# Patient Record
(demographics unavailable — no encounter records)

---

## 2024-10-29 NOTE — END OF VISIT
[] : Fellow [FreeTextEntry3] : 57 yo F with stage IIIB NSCLC, EGFR L858R, s/p chemoRT completed 7/16, followed by adjuvant Tagrisso 80 mg QD per LEE trial here for follow up post hospitalization. --diagnosed with pneumonitis - either radiation induced vs Tagrisso --on prednisone taper per  --on PCP ppx and PPI -- She is symptomatically significantly improved, finally off oxygen able to walk at least 30 minutes without shortness of breath --She is scheduled for repeat CT chest at the end of this week, we discussed depending on CT chest result we could discuss rechallenge with Tagrisso --If CT chest looks significantly improved we could rechallenge with Tagrisso 40 mg daily while she still continues on steroids -- Ordered CBC, CMP today -- Follow-up in this office in 3 weeks, will call her after I discussed her case with Dr. Nieto after repeat imaging [Time Spent: ___ minutes] : I have spent [unfilled] minutes of time on the encounter which excludes teaching and separately reported services.

## 2024-10-29 NOTE — HISTORY OF PRESENT ILLNESS
[Disease: _____________________] : Disease: [unfilled] [T: ___] : T[unfilled] [N: ___] : N[unfilled] [M: ___] : M[unfilled] [AJCC Stage: ____] : AJCC Stage: [unfilled] [90: Able to carry normal activity; minor signs or symptoms of disease.] : 90: Able to carry normal activity; minor signs or symptoms of disease.  [ECOG Performance Status: 1 - Restricted in physically strenuous activity but ambulatory and able to carry out work of a light or sedentary nature] : Performance Status: 1 - Restricted in physically strenuous activity but ambulatory and able to carry out work of a light or sedentary nature, e.g., light house work, office work [de-identified] : America Darby is a 58-year-old female who presents to the clinic for follow up of stage IIIB NSCLC - adenocarcinoma histology (EGFR L858R).  Onx Hx: History obtained via translation from daughters Patient noted to have a cough in September 2023, initially thought to be URI, nonresponsive to steroids, zeepack/antibiotics. Further workup (see below) showed RUL nodule with mediastinal and right hilar lymphadenopathy, and bronchoscopy performed in April confirmed diagnosis of lung adenocarcinoma.  FH Noncontributory  SH nonsmoker, second hand smoking from . No alcohol, other drugs, or other supplements [facility] [employer] - stopped working since daughters prefer her not to travel Lives in [place] Fully functional at home  2/29/24: CTX: Opacity in the medial right upper lobe, right paratracheal region.  3/22/24: CT: Medial subpleural right upper lobe mildy spiculated nodule. Mediastinal and right hilar lymphadenopathy as described, most notably a necrotic right paratracheal jean pierre conglomerate. These findings are suspicious for primary lung malignancy. Question of direct intravascular invasion involving the superior vena cava and left branchiocephalic vein.  4/6/24: PET: Right upper lobe lung mass shows significant metabolic activity and likely malignant. Bilateral supraclavicular, mediastinal and bilateral hilar adenopathy showing moderate-significant activity. Nodularity and thickening along the right major fissure in the upper lobe but no metabolic activity. Left adnexal cyst. In a postmenopausal patient, this can be followed by transvaginal ultrasound urgent basis. Bilateral cystic structures following there is sacral foramen which are probably perineural cysts amd can be further evaluated with MRI of the sacrum.  4/12/24 BAL RUL - non-small cell carcinoma, adenocarcinoma of lung origin  Results of FB EBUS biopsy performed on 04/12/2024 of the RUL bronchoalevolar lavage was positive for malignant cells and was a non-small cell carcinoma. The tracheal biopsy was an adenocarcinoma of lung origin. Lymph node 4R was positive for malignant cells and is a metastatic non-small cell carcinoma. Note: Immunostains performed shows that the tumor cells are positive Tier I: Variants of Strong Clinical Significance  EGFR p.([id number]) Tier II: Variants of Potential Clinical Significance  TP53 p.([id number]) Tier III: Variants of Unknown Clinical Significance CDKN2A p.([id number]) PD-L1 1-2% 4/29/24 MRIB - negative for metastatic disease  6/26/24: CT Chest: 1. Nonocclusive segmental branch pulmonary artery embolism involving the lateral basal segment to the left lower lobe. No evidence of pulmonary hemorrhage or infarction. No pulmonary artery dilatation or right ventricular strain. 2. Loculated right upper lobe neoplasm which may be minimally smaller in size as described above. 3. Stable confluent mediastinal lymphadenopathy with near complete occlusion of the brachiocephalic confluence and proximal IVC. The results of this examination were verbally communicated with read back to the physician, LILLIAN GREEN, on on 6/26/2024 at 12:08 PM.   8/20/24: Ct Chest:  1. Slight interval decrease in the size of a spiculated nodular neoplasm within the apical segment of the right upper lobe. 2. 2 new linear opacities within the apical segment of the right upper lobe as above. Short interval surveillance is suggested. 3. The remainder the study is unchanged with bulky mediastinal and perihilar lymphadenopathy with stable near complete slitlike occlusion of the superior vena cava and mild progressive narrowing of the right main and right upper lobe bronchus.   Ms. Darby recently presented to the Emergency Department (ED) with a 3-4 day history of dyspnea and dry cough. She was found to be hypoxic in the ED, oxygen sat of 90% on room air. CT scan revealed new bilateral symmetric consolidative and ground-glass airspace disease, predominantly in the mid and upper lung fields, raising concerns for radiation pneumonitis. She was started on high dose steroids and HFNC. A transbronchial biopsy was performed, which indicated organizing pneumonia.   She was gradually weaned down to room air, requiring 2 liters of oxygen on exertion. She was discharged on a tapering dose of steroids with Pneumocystis jirovecii pneumonia (PJP) prophylaxis.   [de-identified] : Adenocarcinoma - PD-L1 1-2%, EGFR L858R [de-identified] : CTSx: Dr.Lee Yorkc: Dr.Wernicke [FreeTextEntry1] : 5/23/2024: C1 cisplatin/pemetrexed RT start 6/3/2024 6/13/24   C2 cisplatin/pemetrexed  7/10/2024: C3 cisplatin/pemetrexed completed  7/18/2024: Last day of RT 8/24/2024: Tagriso 80 mg Qd- [de-identified] : The patient was seen and evaluated in the office. SOB has improved and she is not requiring supplemental oxygen. She can walk for up to 20 minutes before feeling short of breath.  She is on a prednisone taper - currently 25mg, 20mg starting Thursday. She has a repeat CT due 11/1/24. Reports fingertip numbness and tingling x 2.5 weeks, which does not interfere with her ADLs. Has an ophthalmology appointment 11/12/24 to evaluate her cataracts.

## 2024-10-29 NOTE — ASSESSMENT
[FreeTextEntry1] : America Darby is a 58-year-old female with no significant prior PMH who presents for follow up of for lung adenocarcinoma, [id number] - Stage IIIB, EGFR L858R.   Stage IIIB Lung Adenocarcinoma with supraclavicular, mediastinal and bilateral hilar adenopathy - EGFR exon 21 L858R mutation positive on NGS - PET/CT on 04/06/2024 - RUL lung mass shows significant metabolic activity, B/L supraclavicular, mediastinal and b/l hilar adenopathy showing moderate-significant metabolic activity; Nodularity and thickening along the right major fissure in the upper lobe but no metabolic activity; Left adnexal cyst.;B/L cystic structures following there is sacral foramen which are probably perineural cysts and can be further evaluated with MRI of the sacrum. - 4/12/2024 - FB EBUS biopsy on 04/12/2024 - tandard of care treatment for stage IIIB lung cancer is concurrent chemoradiation (prefer cisplatin/pemetrexed for adenocarcinoma histology), followed by immunotherapy, though given EGFR mutation, we will substitute tagrisso for maintenance therapy after concurrent chemoradaition despite PACIFIC trial results.  Recent approval based on LEE trial - Tagrisso post CCRT with significant prolongation of PFS.  Completed cisplatin/pemetrexed every 3 weeks x  3 cycles with concurrent RT, was on osimertinib maintenance therapy with plan to continue indefinitely per LEE trail results (June 2024) however, the patient developed pneumonitis and it's been on hold since 9/14  -- We will continue to withhold Tagrisso. We will re-evaluate in four weeks and plan to rechallenge with Tagrisso if she continues to improve in respiratory function.  -- C/w steroid taper and PCP prophylaxis for pneumonitis. She is currently on 25mg prednisone, tapered by 5mg each week. -- Repeat CT scheduled for 11/1/24 -- depending on CT results, plan to rechallenge with 40mg Tagrisso with follow up in 3 weeks. -- ordered labs CBC, CMP -- monitor EKG Q 3-6 months once back on Tagrisso -- labs next visit: CBC, CMP  Pulmonary embolus likely hypercoagulability in setting of malignancy -- Continue with Eliquis 5 mg twice daily  Anemia - (improved, Hb 11.2 today) --monitor

## 2024-10-29 NOTE — HISTORY OF PRESENT ILLNESS
[Disease: _____________________] : Disease: [unfilled] [T: ___] : T[unfilled] [N: ___] : N[unfilled] [M: ___] : M[unfilled] [AJCC Stage: ____] : AJCC Stage: [unfilled] [90: Able to carry normal activity; minor signs or symptoms of disease.] : 90: Able to carry normal activity; minor signs or symptoms of disease.  [ECOG Performance Status: 1 - Restricted in physically strenuous activity but ambulatory and able to carry out work of a light or sedentary nature] : Performance Status: 1 - Restricted in physically strenuous activity but ambulatory and able to carry out work of a light or sedentary nature, e.g., light house work, office work [de-identified] : America Darby is a 58-year-old female who presents to the clinic for follow up of stage IIIB NSCLC - adenocarcinoma histology (EGFR L858R).  Onx Hx: History obtained via translation from daughters Patient noted to have a cough in September 2023, initially thought to be URI, nonresponsive to steroids, zeepack/antibiotics. Further workup (see below) showed RUL nodule with mediastinal and right hilar lymphadenopathy, and bronchoscopy performed in April confirmed diagnosis of lung adenocarcinoma.  FH Noncontributory  SH nonsmoker, second hand smoking from . No alcohol, other drugs, or other supplements [employer] - stopped working since daughters prefer her not to travel Lives in [place] Fully functional at home  2/29/24: CTX: Opacity in the medial right upper lobe, right paratracheal region.  3/22/24: CT: Medial subpleural right upper lobe mildy spiculated nodule. Mediastinal and right hilar lymphadenopathy as described, most notably a necrotic right paratracheal jean pierre conglomerate. These findings are suspicious for primary lung malignancy. Question of direct intravascular invasion involving the superior vena cava and left branchiocephalic vein.  4/6/24: PET: Right upper lobe lung mass shows significant metabolic activity and likely malignant. Bilateral supraclavicular, mediastinal and bilateral hilar adenopathy showing moderate-significant activity. Nodularity and thickening along the right major fissure in the upper lobe but no metabolic activity. Left adnexal cyst. In a postmenopausal patient, this can be followed by transvaginal ultrasound urgent basis. Bilateral cystic structures following there is sacral foramen which are probably perineural cysts amd can be further evaluated with MRI of the sacrum.  4/12/24 BAL RUL - non-small cell carcinoma, adenocarcinoma of lung origin  Results of FB EBUS biopsy performed on 04/12/2024 of the RUL bronchoalevolar lavage was positive for malignant cells and was a non-small cell carcinoma. The tracheal biopsy was an adenocarcinoma of lung origin. Lymph node 4R was positive for malignant cells and is a metastatic non-small cell carcinoma. Note: Immunostains performed shows that the tumor cells are positive Tier I: Variants of Strong Clinical Significance  EGFR p.([id number]) Tier II: Variants of Potential Clinical Significance  TP53 p.([id number]) Tier III: Variants of Unknown Clinical Significance CDKN2A p.([id number]) PD-L1 1-2% 4/29/24 MRIB - negative for metastatic disease  6/26/24: CT Chest: 1. Nonocclusive segmental branch pulmonary artery embolism involving the lateral basal segment to the left lower lobe. No evidence of pulmonary hemorrhage or infarction. No pulmonary artery dilatation or right ventricular strain. 2. Loculated right upper lobe neoplasm which may be minimally smaller in size as described above. 3. Stable confluent mediastinal lymphadenopathy with near complete occlusion of the brachiocephalic confluence and proximal IVC. The results of this examination were verbally communicated with read back to the physician, LILLIAN GREEN, on on 6/26/2024 at 12:08 PM.   8/20/24: Ct Chest:  1. Slight interval decrease in the size of a spiculated nodular neoplasm within the apical segment of the right upper lobe. 2. 2 new linear opacities within the apical segment of the right upper lobe as above. Short interval surveillance is suggested. 3. The remainder the study is unchanged with bulky mediastinal and perihilar lymphadenopathy with stable near complete slitlike occlusion of the superior vena cava and mild progressive narrowing of the right main and right upper lobe bronchus.   Ms. Darby recently presented to the Emergency Department (ED) with a 3-4 day history of dyspnea and dry cough. She was found to be hypoxic in the ED, oxygen sat of 90% on room air. CT scan revealed new bilateral symmetric consolidative and ground-glass airspace disease, predominantly in the mid and upper lung fields, raising concerns for radiation pneumonitis. She was started on high dose steroids and HFNC. A transbronchial biopsy was performed, which indicated organizing pneumonia.   She was gradually weaned down to room air, requiring 2 liters of oxygen on exertion. She was discharged on a tapering dose of steroids with Pneumocystis jirovecii pneumonia (PJP) prophylaxis.   [de-identified] : Adenocarcinoma - PD-L1 1-2%, EGFR L858R [de-identified] : CTSx: Dr.Lee Yorkc: Dr.Wernicke [FreeTextEntry1] : 5/23/2024: C1 cisplatin/pemetrexed RT start 6/3/2024 6/13/24   C2 cisplatin/pemetrexed  7/10/2024: C3 cisplatin/pemetrexed completed  7/18/2024: Last day of RT 8/24/2024: Tagriso 80 mg Qd- [de-identified] : The patient was seen and evaluated in the office. SOB has improved and she is not requiring supplemental oxygen. She can walk for up to 20 minutes before feeling short of breath.  She is on a prednisone taper - currently 25mg, 20mg starting Thursday. She has a repeat CT due 11/1/24. Reports fingertip numbness and tingling x 2.5 weeks, which does not interfere with her ADLs. Has an ophthalmology appointment 11/12/24 to evaluate her cataracts.

## 2024-10-29 NOTE — ASSESSMENT
[FreeTextEntry1] : America Darby is a 58-year-old female with no significant prior PMH who presents for follow up of for lung adenocarcinoma, [id number] - Stage IIIB, EGFR L858R.   Stage IIIB Lung Adenocarcinoma with supraclavicular, mediastinal and bilateral hilar adenopathy - EGFR exon 21 L858R mutation positive on NGS - PET/CT on 04/06/2024 - RUL lung mass shows significant metabolic activity, B/L supraclavicular, mediastinal and b/l hilar adenopathy showing moderate-significant metabolic activity; Nodularity and thickening along the right major fissure in the upper lobe but no metabolic activity; Left adnexal cyst.;B/L cystic structures following there is sacral foramen which are probably perineural cysts and can be further evaluated with MRI of the sacrum. - 4/12/2024 - FB EBUS biopsy on 04/12/2024 - tandard of care treatment for stage IIIB lung cancer is concurrent chemoradiation (prefer cisplatin/pemetrexed for adenocarcinoma histology), followed by immunotherapy, though given EGFR mutation, we will substitute tagrisso for maintenance therapy after concurrent chemoradaition despite PACIFIC trial results.  Recent approval based on LEE trial - Tagrisso post CCRT with significant prolongation of PFS.  Completed cisplatin/pemetrexed every 3 weeks x  3 cycles with concurrent RT, was on osimertinib maintenance therapy with plan to continue indefinitely per LEE trail results (June 2024) however, the patient developed pneumonitis and it's been on hold since 9/14  -- We will continue to withhold Tagrisso. We will re-evaluate in four weeks and plan to rechallenge with Tagrisso if she continues to improve in respiratory function.  -- C/w steroid taper and PCP prophylaxis for pneumonitis. She is currently on 25mg prednisone, tapered by 5mg each week. -- Repeat CT scheduled for 11/1/24 -- depending on CT results, plan to rechallenge with 40mg Tagrisso with follow up in 3 weeks. -- ordered labs CBC, CMP -- monitor EKG Q 3-6 months once back on Tagrisso -- labs next visit: CBC, CMP  Pulmonary embolus likely hypercoagulability in setting of malignancy -- Continue with Eliquis 5 mg twice daily  Anemia - (improved, Hb 11.2 today) --monitor

## 2024-10-29 NOTE — ASSESSMENT
[FreeTextEntry1] : America Darby is a 58-year-old female with no significant prior PMH who presents for follow up of for lung adenocarcinoma, [id number] - Stage IIIB, EGFR L858R.   Stage IIIB Lung Adenocarcinoma with supraclavicular, mediastinal and bilateral hilar adenopathy - EGFR exon 21 L858R mutation positive on NGS - PET/CT on 04/06/2024 - RUL lung mass shows significant metabolic activity, B/L supraclavicular, mediastinal and b/l hilar adenopathy showing moderate-significant metabolic activity; Nodularity and thickening along the right major fissure in the upper lobe but no metabolic activity; Left adnexal cyst.;B/L cystic structures following there is sacral foramen which are probably perineural cysts and can be further evaluated with MRI of the sacrum. - 4/12/2024 - FB EBUS biopsy on 04/12/2024 - tandard of care treatment for stage IIIB lung cancer is concurrent chemoradiation (prefer cisplatin/pemetrexed for adenocarcinoma histology), followed by immunotherapy, though given EGFR mutation, we will substitute tagrisso for maintenance therapy after concurrent chemoradaition despite PACIFIC trial results.  Recent approval based on LEE trial - Tagrisso post CCRT with significant prolongation of PFS.  Completed cisplatin/pemetrexed every 3 weeks x  3 cycles with concurrent RT, was on osimertinib maintenance therapy with plan to continue indefinitely per LEE trail results (June 2024) however, the patient developed pneumonitis and it's been on hold since 9/14  -- We will continue to withhold Tagrisso. We will re-evaluate in four weeks and plan to rechallenge with Tagrisso if she continues to improve in respiratory function.  -- C/w steroid taper and PCP prophylaxis for pneumonitis. She is currently on 25mg prednisone, tapered by 5mg each week. -- Repeat CT scheduled for 11/1/24 -- depending on CT results, plan to rechallenge with 40mg Tagrisso with follow up in 3 weeks. -- ordered labs CBC, CMP -- monitor EKG Q 3-6 months once back on Tagrisso -- labs next visit: CBC, CMP  Pulmonary embolus likely hypercoagulability in setting of malignancy -- Continue with Eliquis 5 mg twice daily  Anemia - (improved, Hb 11.2 today) --monitor

## 2024-10-29 NOTE — HISTORY OF PRESENT ILLNESS
[Disease: _____________________] : Disease: [unfilled] [T: ___] : T[unfilled] [N: ___] : N[unfilled] [M: ___] : M[unfilled] [AJCC Stage: ____] : AJCC Stage: [unfilled] [90: Able to carry normal activity; minor signs or symptoms of disease.] : 90: Able to carry normal activity; minor signs or symptoms of disease.  [ECOG Performance Status: 1 - Restricted in physically strenuous activity but ambulatory and able to carry out work of a light or sedentary nature] : Performance Status: 1 - Restricted in physically strenuous activity but ambulatory and able to carry out work of a light or sedentary nature, e.g., light house work, office work [de-identified] : America Darby is a 58-year-old female who presents to the clinic for follow up of stage IIIB NSCLC - adenocarcinoma histology (EGFR L858R).  Onx Hx: History obtained via translation from daughters Patient noted to have a cough in September 2023, initially thought to be URI, nonresponsive to steroids, zeepack/antibiotics. Further workup (see below) showed RUL nodule with mediastinal and right hilar lymphadenopathy, and bronchoscopy performed in April confirmed diagnosis of lung adenocarcinoma.  FH Noncontributory  SH nonsmoker, second hand smoking from . No alcohol, other drugs, or other supplements [facility] - stopped working since daughters prefer her not to travel Lives in [place] Fully functional at home  2/29/24: CTX: Opacity in the medial right upper lobe, right paratracheal region.  3/22/24: CT: Medial subpleural right upper lobe mildy spiculated nodule. Mediastinal and right hilar lymphadenopathy as described, most notably a necrotic right paratracheal jean pierre conglomerate. These findings are suspicious for primary lung malignancy. Question of direct intravascular invasion involving the superior vena cava and left branchiocephalic vein.  4/6/24: PET: Right upper lobe lung mass shows significant metabolic activity and likely malignant. Bilateral supraclavicular, mediastinal and bilateral hilar adenopathy showing moderate-significant activity. Nodularity and thickening along the right major fissure in the upper lobe but no metabolic activity. Left adnexal cyst. In a postmenopausal patient, this can be followed by transvaginal ultrasound urgent basis. Bilateral cystic structures following there is sacral foramen which are probably perineural cysts amd can be further evaluated with MRI of the sacrum.  4/12/24 BAL RUL - non-small cell carcinoma, adenocarcinoma of lung origin  Results of FB EBUS biopsy performed on 04/12/2024 of the RUL bronchoalevolar lavage was positive for malignant cells and was a non-small cell carcinoma. The tracheal biopsy was an adenocarcinoma of lung origin. Lymph node 4R was positive for malignant cells and is a metastatic non-small cell carcinoma. Note: Immunostains performed shows that the tumor cells are positive Tier I: Variants of Strong Clinical Significance  EGFR p.([id number]) Tier II: Variants of Potential Clinical Significance  TP53 p.([id number]) Tier III: Variants of Unknown Clinical Significance CDKN2A p.([id number]) PD-L1 1-2% 4/29/24 MRIB - negative for metastatic disease  6/26/24: CT Chest: 1. Nonocclusive segmental branch pulmonary artery embolism involving the lateral basal segment to the left lower lobe. No evidence of pulmonary hemorrhage or infarction. No pulmonary artery dilatation or right ventricular strain. 2. Loculated right upper lobe neoplasm which may be minimally smaller in size as described above. 3. Stable confluent mediastinal lymphadenopathy with near complete occlusion of the brachiocephalic confluence and proximal IVC. The results of this examination were verbally communicated with read back to the physician, LILLIAN GREEN, on on 6/26/2024 at 12:08 PM.   8/20/24: Ct Chest:  1. Slight interval decrease in the size of a spiculated nodular neoplasm within the apical segment of the right upper lobe. 2. 2 new linear opacities within the apical segment of the right upper lobe as above. Short interval surveillance is suggested. 3. The remainder the study is unchanged with bulky mediastinal and perihilar lymphadenopathy with stable near complete slitlike occlusion of the superior vena cava and mild progressive narrowing of the right main and right upper lobe bronchus.   Ms. Darby recently presented to the Emergency Department (ED) with a 3-4 day history of dyspnea and dry cough. She was found to be hypoxic in the ED, oxygen sat of 90% on room air. CT scan revealed new bilateral symmetric consolidative and ground-glass airspace disease, predominantly in the mid and upper lung fields, raising concerns for radiation pneumonitis. She was started on high dose steroids and HFNC. A transbronchial biopsy was performed, which indicated organizing pneumonia.   She was gradually weaned down to room air, requiring 2 liters of oxygen on exertion. She was discharged on a tapering dose of steroids with Pneumocystis jirovecii pneumonia (PJP) prophylaxis.   [de-identified] : Adenocarcinoma - PD-L1 1-2%, EGFR L858R [de-identified] : CTSx: Dr.Lee Yorkc: Dr.Wernicke [FreeTextEntry1] : 5/23/2024: C1 cisplatin/pemetrexed RT start 6/3/2024 6/13/24   C2 cisplatin/pemetrexed  7/10/2024: C3 cisplatin/pemetrexed completed  7/18/2024: Last day of RT 8/24/2024: Tagriso 80 mg Qd- [de-identified] : The patient was seen and evaluated in the office. SOB has improved and she is not requiring supplemental oxygen. She can walk for up to 20 minutes before feeling short of breath.  She is on a prednisone taper - currently 25mg, 20mg starting Thursday. She has a repeat CT due 11/1/24. Reports fingertip numbness and tingling x 2.5 weeks, which does not interfere with her ADLs. Has an ophthalmology appointment 11/12/24 to evaluate her cataracts.

## 2024-11-06 NOTE — ASSESSMENT
[FreeTextEntry1] : Labs: 10/2024: WBC 12.6, Hgb 9.9, Plts 273 Na 133 K 4.1, Cl 102, HCO3 28, BUN/creat 18/1.00, glucose 167, Ca 9.1 Tbili 0.6, AST/ALT 63/59, Alk phos 66, TP/Albumin 6.4/2.8  Bronchoscopy (4/2024): BAL: POSITIVE FOR MALIGNANT CELLS. Non- small cell carcinoma.  Final Diagnosis Trachea, biopsy and touch preparation: -Adenocarcinoma of lung origin. Note: Immunostains performed shows that the tumor cells are positive for TTF-1 and negative for p40.  This  staining profile supports the diagnosis.  Final Diagnosis LYMPH NODE, 4R, EBUS-GUIDED FNA POSITIVE FOR MALIGNANT CELLS. Metastatic non-small cell carcinoma. Cell block shows similar findings. Positive EGFR mutation  Bronchoscopy (9/2024): WBC 38 (L15, P12, M8, Eos 3)  BRONCHOALVEOLAR LAVAGE, RIGHT, LOWER LOBE Final Diagnosis LUNG, RIGHT LOWER LOBE, BAL: NEGATIVE FOR MALIGNANT CELLS Benign bronchial cells, alveolar macrophages, inflammatory cells. The cellblock shows similar findings.  Transbronchial biopsy: Final Diagnosis 1. Lung , right lower lobe, biopsy: - A minute fragment of lung tissue with reactive changes and focal areas suggestive of an organizing pneumonia.  Bacterial culture: negative AFB culture: NGTD Fungus culture: NGTD  BAL PJP PCR negative.  PET/CT (4/2024): Right upper lobe lung mass shows significant metabolic activity and likely malignant. Bilateral supraclavicular, mediastinal and bilateral hilar adenopathy showing moderate-significant activity. Nodularity and thickening along the right major fissure in the upper lobe but no metabolic activity. Left adnexal cyst. In a postmenopausal patient, this can be followed by transvaginal ultrasound urgent basis. Bilateral cystic structures following there is sacral foramen which are probably perineural cysts and can be further evaluated with MRI of the sacrum.  Chest CT (6/26/24) 1. Nonocclusive segmental branch pulmonary artery embolism involving the lateral basal segment to the left lower lobe. No evidence of pulmonary hemorrhage or infarction. No pulmonary artery dilatation or right ventricular strain. Loculated right upper lobe neoplasm which may be minimally smaller in size. Stable confluent mediastinal lymphadenopathy with near complete occlusion of the brachiocephalic confluence and proximal IVC  Ct Chest (8/2024): 1. Slight interval decrease in the size of a spiculated nodular neoplasm within the apical segment of the right upper lobe. 2. 2 new linear opacities within the apical segment of the right upper lobe as above. Short interval surveillance is suggested. 3. The remainder the study is unchanged with bulky mediastinal and perihilar lymphadenopathy with stable near complete slitlike occlusion of the superior vena cava and mild progressive narrowing of the right main and right upper lobe bronchus.  CT PE (9/2024): IMPRESSION: 1. No pulmonary embolism. 2. Since August 20, 2024, new bilateral symmetric consolidative and groundglass airspace disease with mid and upper lung field predominance. Differential for this pattern includes noncardiogenic pulmonary edema (including ARDS), hypersensitivity pneumonitis, cardiogenic pulmonary edema/CHF, pulmonary alveolar proteinosis, diffuse alveolar hemorrhage, or atypical pneumonia (including Pneumocystis jirovecii). 3. Mildly increased right pleural effusion.  Chest CT (11/2024): IMPRESSION: 1. Interval resolution of perihilar crazy paving opacities with newly developing perihilar consolidation which encases the bronchovascular bundles with newly developing traction bronchiectasis. This may represent the sequela of radiation fibrosis. Possibility of lymphangitic carcinomatosis cannot be excluded. 2. Mild interval improvement in diffuse infiltration of the mediastinal fat planes which may represent a mildly improving lymphadenopathy. 3. Limited evaluation of the vessels and inferior vena cava the absence of intravenous contrast.  PFTs: pending  A/P: 59 yo F h/o stage IIIB NSCLC (RUL mass, tracheal involvement N2 and N3 lymph nodes) s/p concurrent chemoradiation and Osimertinib), recent PE, and Afib returns to clinic after a recent hospitalization for cancer treatment-related pneumonitis.  I spoke to Ms. Darby, her daughter, and her sons-in-law. She is clinically improving. On my review, her chest CT is significantly improved. The airspace disease is largely resolved; the pleural effusion is nearly resolved. There are consolidative changes with bronchiectasis that are upper/middle lobe predominant and medially-located. I think the current imaging is consistent with significant pneumonitis improvement and residual radiation-related changes. I suspect she will continue to have some improvement in the consolidative changes.   For the PE, she remains on Apixaban. Given her malignancy, I would favor an extended course.  I spoke to Dr. Ahuja, and we are planning to re-start lower dose Osimertinib in 1-2 weeks (when her daughter is back in town).  1. Cancer treatment-related pneumonitis: radiation vs chemotherapy vs Osimertinib 2. NSCLC (stage IIIB) with EGFR mutation 3. Fatigue 4. h/o PE (6/2024)  -continue Prednisone wean; currently 25 mg PO daily; weaning 5 mg/week. She will discontinue Bactrim when her Prednisone dosing falls <20 mg/day. -Famotidine 20 mg PO daily -Bactrim 80/400 daily; Calcium and Vitamin D supplementation -continue Apixaban 5 mg PO BID -PFTs and 6MWT at next visit -recommended regular physical activity -follow-up with me in 8 weeks

## 2024-11-06 NOTE — HISTORY OF PRESENT ILLNESS
[TextBox_4] : Ms. America Darby returned to clinic today in follow-up for lung cancer with treatment-related pneumonitis; she is accompanied by her son-in-law, who supplements her history. In review, Ms. Darby is a 59 yo F h/o NSCLC (adenocarcinoma, stage IIIB (RUL, tracheal involvement, mediastinal and supraclavicular lymph nodes; EGFR mutation) s/p concurrent chemoradiation with adjuvant Osimertinib who was hospitalized in 9/2024 for cough, progressive exertional dyspnea, and acute hypoxemic respiratory failure. She underwent bronchoscopy that ruled out alveolar hemorrhage; organizing pneumonia on biopsy and the clinical presentation favored drug related pneumonitis. With 2 mg/kg Methylprednisolone, she improved. A small R pleural effusion was noted during hospitalization, though not amenable to thoracentesis.  10/2024: Since discharge, Ms. Darby has felt steadily better. She is no longer using supplemental oxygen. She is able to walk at a normal pace for 30 minutes before having to rest. Her appetite and weight are stable. She is not having cough. With Prednisone, she is having some epigastric burning.  10/2024: Ms. Darby has continued to feel better on her steroid taper, currently at Prednisone 25 mg PO daily. She has remained active via walking 30 minutes/day. She has some dyspnea when walking up hills. Her appetite and weight are stable. She has noticed some blurriness in her vision, which she associates with cataracts. She has an Ophthalmology visit next week.  Cancer treatment course 5/23/2024: C1 cisplatin/pemetrexed RT start 6/3/2024 6/13/24 C2 cisplatin/pemetrexed 7/10/2024: C3 cisplatin/pemetrexed completed 7/18/2024: Last day of RT 8/24/2024: Osimertinib 80 mg Qd-.  PMH: NSCLC PE (LLL; 7/2024) Afib s/p ablation  SH: Never smoker. History of second-hand smoke exposure.

## 2024-11-06 NOTE — REASON FOR VISIT
[Follow-Up] : a follow-up visit [Lung Cancer] : lung cancer [Family Member] : family member [TextBox_44] : Pneumonitis

## 2024-11-06 NOTE — PHYSICAL EXAM
[No Acute Distress] : no acute distress [Normal Appearance] : normal appearance [Normal Rate/Rhythm] : normal rate/rhythm [Normal S1, S2] : normal s1, s2 [No Murmurs] : no murmurs [No Resp Distress] : no resp distress [No Abnormalities] : no abnormalities [Benign] : benign [Normal Gait] : normal gait [No Clubbing] : no clubbing [No Cyanosis] : no cyanosis [No Edema] : no edema [FROM] : FROM [Normal Color/ Pigmentation] : normal color/ pigmentation [No Focal Deficits] : no focal deficits [Oriented x3] : oriented x3 [Normal Affect] : normal affect [TextBox_11] : NC/AT [TextBox_68] : Reduced breath sounds in b/l bases; scattered inspiratory crackles; no wheezing/rhonchi.

## 2024-11-06 NOTE — ADDENDUM
[FreeTextEntry1] : Addendum (Emilia; 10/21/24): Ms. Darby's daughter called me today to report worsened vision that has developed and worsened over the last several weeks. The vision is worse in the L and blurry. She is suspicious for cataracts. We discussed warning signs for which emergent ER visit should be sought to evaluate for emergent etiologies (e.g., stroke, retinal artery occlusion), including abrupt vision change, pain, or on-going vision change. Ms. Darby suspects the Prednisone is accelerating her cataracts, which is possible. They have scheduled an Ophthalmology appointment. In the interim, we will reduce her Prednisone more rapidly. She is currently taking 35 mg PO daily. We will reduce her dose to 20 mg PO daily, and they will call me after the Ophthalmology visit or with any other clinical change.

## 2024-11-18 NOTE — ASSESSMENT
[FreeTextEntry1] : America Darby is a 58-year-old female with no significant prior PMH who presents for follow up of for lung adenocarcinoma, [id number] - Stage IIIB, EGFR L858R.   Stage IIIB Lung Adenocarcinoma with supraclavicular, mediastinal and bilateral hilar adenopathy - EGFR exon 21 L858R mutation positive on NGS - PET/CT on 04/06/2024 - RUL lung mass shows significant metabolic activity, B/L supraclavicular, mediastinal and b/l hilar adenopathy showing moderate-significant metabolic activity; Nodularity and thickening along the right major fissure in the upper lobe but no metabolic activity; Left adnexal cyst.;B/L cystic structures following there is sacral foramen which are probably perineural cysts and can be further evaluated with MRI of the sacrum. - 4/12/2024 - FB EBUS biopsy on 04/12/2024 - tandard of care treatment for stage IIIB lung cancer is concurrent chemoradiation (prefer cisplatin/pemetrexed for adenocarcinoma histology), followed by immunotherapy, though given EGFR mutation, we will substitute tagrisso for maintenance therapy after concurrent chemoradaition despite PACIFIC trial results.  Recent approval based on LEE trial - Tagrisso post CCRT with significant prolongation of PFS.  Completed cisplatin/pemetrexed every 3 weeks x  3 cycles with concurrent RT, was on osimertinib maintenance therapy with plan to continue indefinitely per LEE trail results (June 2024) however, the patient developed pneumonitis and it's been on hold since 9/14  -- We will continue to withhold Tagrisso. We will re-evaluate in four weeks and plan to rechallenge with Tagrisso if she continues to improve in respiratory function.  -- C/w steroid taper and PCP prophylaxis for pneumonitis. She is currently on 25mg prednisone, tapered by 5mg each week. -- Repeat CT scheduled for 11/1/24 -- depending on CT results, plan to rechallenge with 40mg Tagrisso with follow up in 3 weeks. -- ordered labs CBC, CMP -- monitor EKG Q 3-6 months once back on Tagrisso -- labs next visit: CBC, CMP  Pulmonary embolus likely hypercoagulability in setting of malignancy -- Continue with Eliquis 5 mg twice daily  Anemia - (improved, Hb 11.2 today) --monitor

## 2024-11-18 NOTE — END OF VISIT
[FreeTextEntry3] : 57 yo F with stage IIIB NSCLC, EGFR L858R, s/p chemoRT completed 7/16, followed by adjuvant Tagrisso 80 mg QD per LEE trial here for follow up post hospitalization. --diagnosed with pneumonitis - either radiation induced vs Tagrisso --on prednisone taper per  --on PCP ppx and PPI -- She is symptomatically significantly improved, finally off oxygen able to walk at least 30 minutes without shortness of breath --She is scheduled for repeat CT chest at the end of this week, we discussed depending on CT chest result we could discuss rechallenge with Tagrisso --If CT chest looks significantly improved we could rechallenge with Tagrisso 40 mg daily while she still continues on steroids -- Ordered CBC, CMP today -- Follow-up in this office in 3 weeks, will call her after I discussed her case with Dr. Nieto after repeat imaging

## 2024-11-18 NOTE — HISTORY OF PRESENT ILLNESS
[de-identified] : America Darby is a 58-year-old female who presents to the clinic for follow up of stage IIIB NSCLC - adenocarcinoma histology (EGFR L858R).  Onx Hx: History obtained via translation from daughters Patient noted to have a cough in September 2023, initially thought to be URI, nonresponsive to steroids, zeepack/antibiotics. Further workup (see below) showed RUL nodule with mediastinal and right hilar lymphadenopathy, and bronchoscopy performed in April confirmed diagnosis of lung adenocarcinoma.  FH Noncontributory  SH nonsmoker, second hand smoking from . No alcohol, other drugs, or other supplements [facility] [employer] - stopped working since daughters prefer her not to travel Lives in [place] Fully functional at home  2/29/24: CTX: Opacity in the medial right upper lobe, right paratracheal region.  3/22/24: CT: Medial subpleural right upper lobe mildy spiculated nodule. Mediastinal and right hilar lymphadenopathy as described, most notably a necrotic right paratracheal jean pierre conglomerate. These findings are suspicious for primary lung malignancy. Question of direct intravascular invasion involving the superior vena cava and left branchiocephalic vein.  4/6/24: PET: Right upper lobe lung mass shows significant metabolic activity and likely malignant. Bilateral supraclavicular, mediastinal and bilateral hilar adenopathy showing moderate-significant activity. Nodularity and thickening along the right major fissure in the upper lobe but no metabolic activity. Left adnexal cyst. In a postmenopausal patient, this can be followed by transvaginal ultrasound urgent basis. Bilateral cystic structures following there is sacral foramen which are probably perineural cysts amd can be further evaluated with MRI of the sacrum.  4/12/24 BAL RUL - non-small cell carcinoma, adenocarcinoma of lung origin  Results of FB EBUS biopsy performed on 04/12/2024 of the RUL bronchoalevolar lavage was positive for malignant cells and was a non-small cell carcinoma. The tracheal biopsy was an adenocarcinoma of lung origin. Lymph node 4R was positive for malignant cells and is a metastatic non-small cell carcinoma. Note: Immunostains performed shows that the tumor cells are positive Tier I: Variants of Strong Clinical Significance  EGFR p.([id number]) Tier II: Variants of Potential Clinical Significance  TP53 p.([id number]) Tier III: Variants of Unknown Clinical Significance CDKN2A p.([id number]) PD-L1 1-2% 4/29/24 MRIB - negative for metastatic disease  6/26/24: CT Chest: 1. Nonocclusive segmental branch pulmonary artery embolism involving the lateral basal segment to the left lower lobe. No evidence of pulmonary hemorrhage or infarction. No pulmonary artery dilatation or right ventricular strain. 2. Loculated right upper lobe neoplasm which may be minimally smaller in size as described above. 3. Stable confluent mediastinal lymphadenopathy with near complete occlusion of the brachiocephalic confluence and proximal IVC. The results of this examination were verbally communicated with read back to the physician, LILLIAN GREEN, on on 6/26/2024 at 12:08 PM.   8/20/24: Ct Chest:  1. Slight interval decrease in the size of a spiculated nodular neoplasm within the apical segment of the right upper lobe. 2. 2 new linear opacities within the apical segment of the right upper lobe as above. Short interval surveillance is suggested. 3. The remainder the study is unchanged with bulky mediastinal and perihilar lymphadenopathy with stable near complete slitlike occlusion of the superior vena cava and mild progressive narrowing of the right main and right upper lobe bronchus.   Ms. Darby recently presented to the Emergency Department (ED) with a 3-4 day history of dyspnea and dry cough. She was found to be hypoxic in the ED, oxygen sat of 90% on room air. CT scan revealed new bilateral symmetric consolidative and ground-glass airspace disease, predominantly in the mid and upper lung fields, raising concerns for radiation pneumonitis. She was started on high dose steroids and HFNC. A transbronchial biopsy was performed, which indicated organizing pneumonia.   She was gradually weaned down to room air, requiring 2 liters of oxygen on exertion. She was discharged on a tapering dose of steroids with Pneumocystis jirovecii pneumonia (PJP) prophylaxis.   11/1/24: CT Chest:  1. Interval resolution of perihilar crazy paving opacities with newly developing perihilar consolidation which encases the bronchovascular bundles with newly developing traction bronchiectasis. This may represent the sequela of radiation fibrosis. Possibility of lymphangitic carcinomatosis cannot be excluded. 2. Mild interval improvement in diffuse infiltration of the mediastinal fat planes which may represent a mildly improving lymphadenopathy. 3. Limited evaluation of the vessels and inferior vena cava the absence of intravenous contrast.  [de-identified] : Adenocarcinoma - PD-L1 1-2%, EGFR L858R [de-identified] : CTSx: Dr.Lee Yorkc: Dr.Wernicke [FreeTextEntry1] : 5/23/2024: C1 cisplatin/pemetrexed RT start 6/3/2024 6/13/24   C2 cisplatin/pemetrexed  7/10/2024: C3 cisplatin/pemetrexed completed  7/18/2024: Last day of RT 8/24/2024: Tagriso 80 mg Qd- [de-identified] : The patient was seen and evaluated in the office. SOB has improved and she is not requiring supplemental oxygen. She can walk for up to 20 minutes before feeling short of breath.  She is on a prednisone taper - currently 25mg, 20mg starting Thursday. She has a repeat CT due 11/1/24. Reports fingertip numbness and tingling x 2.5 weeks, which does not interfere with her ADLs. Has an ophthalmology appointment 11/12/24 to evaluate her cataracts.

## 2024-11-22 NOTE — HISTORY OF PRESENT ILLNESS
[de-identified] : America Darby is a 58-year-old female who presents to the clinic for follow up of stage IIIB NSCLC - adenocarcinoma histology (EGFR L858R).  Onx Hx: History obtained via translation from daughters Patient noted to have a cough in September 2023, initially thought to be URI, nonresponsive to steroids, zeepack/antibiotics. Further workup (see below) showed RUL nodule with mediastinal and right hilar lymphadenopathy, and bronchoscopy performed in April confirmed diagnosis of lung adenocarcinoma.  FH Noncontributory  SH nonsmoker, second hand smoking from . No alcohol, other drugs, or other supplements [facility] [employer] - stopped working since daughters prefer her not to travel Lives in [place] Fully functional at home  2/29/24: CTX: Opacity in the medial right upper lobe, right paratracheal region.  3/22/24: CT: Medial subpleural right upper lobe mildy spiculated nodule. Mediastinal and right hilar lymphadenopathy as described, most notably a necrotic right paratracheal jean pierre conglomerate. These findings are suspicious for primary lung malignancy. Question of direct intravascular invasion involving the superior vena cava and left branchiocephalic vein.  4/6/24: PET: Right upper lobe lung mass shows significant metabolic activity and likely malignant. Bilateral supraclavicular, mediastinal and bilateral hilar adenopathy showing moderate-significant activity. Nodularity and thickening along the right major fissure in the upper lobe but no metabolic activity. Left adnexal cyst. In a postmenopausal patient, this can be followed by transvaginal ultrasound urgent basis. Bilateral cystic structures following there is sacral foramen which are probably perineural cysts amd can be further evaluated with MRI of the sacrum.  4/12/24 BAL RUL - non-small cell carcinoma, adenocarcinoma of lung origin  Results of FB EBUS biopsy performed on 04/12/2024 of the RUL bronchoalevolar lavage was positive for malignant cells and was a non-small cell carcinoma. The tracheal biopsy was an adenocarcinoma of lung origin. Lymph node 4R was positive for malignant cells and is a metastatic non-small cell carcinoma. Note: Immunostains performed shows that the tumor cells are positive Tier I: Variants of Strong Clinical Significance  EGFR p.([id number]) Tier II: Variants of Potential Clinical Significance  TP53 p.([id number]) Tier III: Variants of Unknown Clinical Significance CDKN2A p.([id number]) PD-L1 1-2% 4/29/24 MRIB - negative for metastatic disease  6/26/24: CT Chest: 1. Nonocclusive segmental branch pulmonary artery embolism involving the lateral basal segment to the left lower lobe. No evidence of pulmonary hemorrhage or infarction. No pulmonary artery dilatation or right ventricular strain. 2. Loculated right upper lobe neoplasm which may be minimally smaller in size as described above. 3. Stable confluent mediastinal lymphadenopathy with near complete occlusion of the brachiocephalic confluence and proximal IVC. The results of this examination were verbally communicated with read back to the physician, LILLIAN GREEN, on on 6/26/2024 at 12:08 PM.   8/20/24: Ct Chest:  1. Slight interval decrease in the size of a spiculated nodular neoplasm within the apical segment of the right upper lobe. 2. 2 new linear opacities within the apical segment of the right upper lobe as above. Short interval surveillance is suggested. 3. The remainder the study is unchanged with bulky mediastinal and perihilar lymphadenopathy with stable near complete slitlike occlusion of the superior vena cava and mild progressive narrowing of the right main and right upper lobe bronchus.   Ms. Darby recently presented to the Emergency Department (ED) with a 3-4 day history of dyspnea and dry cough. She was found to be hypoxic in the ED, oxygen sat of 90% on room air. CT scan revealed new bilateral symmetric consolidative and ground-glass airspace disease, predominantly in the mid and upper lung fields, raising concerns for radiation pneumonitis. She was started on high dose steroids and HFNC. A transbronchial biopsy was performed, which indicated organizing pneumonia.   She was gradually weaned down to room air, requiring 2 liters of oxygen on exertion. She was discharged on a tapering dose of steroids with Pneumocystis jirovecii pneumonia (PJP) prophylaxis.   11/1/24: CT Chest:  1. Interval resolution of perihilar crazy paving opacities with newly developing perihilar consolidation which encases the bronchovascular bundles with newly developing traction bronchiectasis. This may represent the sequela of radiation fibrosis. Possibility of lymphangitic carcinomatosis cannot be excluded. 2. Mild interval improvement in diffuse infiltration of the mediastinal fat planes which may represent a mildly improving lymphadenopathy. 3. Limited evaluation of the vessels and inferior vena cava the absence of intravenous contrast.  [de-identified] : Adenocarcinoma - PD-L1 1-2%, EGFR L858R [de-identified] : CTSx: Dr.Lee Yorkc: Dr.Wernicke [FreeTextEntry1] : 5/23/2024: C1 cisplatin/pemetrexed RT start 6/3/2024 6/13/24   C2 cisplatin/pemetrexed  7/10/2024: C3 cisplatin/pemetrexed completed  7/18/2024: Last day of RT 8/24/2024: Tagriso 80 mg Qd- [de-identified] : The patient was seen and evaluated in the office. SOB has improved and she is not requiring supplemental oxygen. She can walk for up to 20 minutes before feeling short of breath.  She is on a prednisone taper - currently 25mg, 20mg starting Thursday. She has a repeat CT due 11/1/24. Reports fingertip numbness and tingling x 2.5 weeks, which does not interfere with her ADLs. Has an ophthalmology appointment 11/12/24 to evaluate her cataracts.

## 2024-11-22 NOTE — END OF VISIT
[FreeTextEntry3] : 59 yo F with stage IIIB NSCLC, EGFR L858R, s/p chemoRT completed 7/16, followed by adjuvant Tagrisso 80 mg QD per LEE trial here for follow up post hospitalization. --diagnosed with pneumonitis - either radiation induced vs Tagrisso --on prednisone taper per  --on PCP ppx and PPI -- She is symptomatically significantly improved, finally off oxygen able to walk at least 30 minutes without shortness of breath --She is scheduled for repeat CT chest at the end of this week, we discussed depending on CT chest result we could discuss rechallenge with Tagrisso --If CT chest looks significantly improved we could rechallenge with Tagrisso 40 mg daily while she still continues on steroids -- Ordered CBC, CMP today -- Follow-up in this office in 3 weeks, will call her after I discussed her case with Dr. Nieto after repeat imaging

## 2024-11-22 NOTE — ASSESSMENT
[FreeTextEntry1] : America Darby is a 58-year-old female with no significant prior PMH who presents for follow up of for lung adenocarcinoma, [id number] - Stage IIIB, EGFR L858R.   Stage IIIB Lung Adenocarcinoma with supraclavicular, mediastinal and bilateral hilar adenopathy - EGFR exon 21 L858R mutation positive on NGS - PET/CT on 04/06/2024 - RUL lung mass shows significant metabolic activity, B/L supraclavicular, mediastinal and b/l hilar adenopathy showing moderate-significant metabolic activity; Nodularity and thickening along the right major fissure in the upper lobe but no metabolic activity; Left adnexal cyst.;B/L cystic structures following there is sacral foramen which are probably perineural cysts and can be further evaluated with MRI of the sacrum. - 4/12/2024 - FB EBUS biopsy on 04/12/2024 - tandard of care treatment for stage IIIB lung cancer is concurrent chemoradiation (prefer cisplatin/pemetrexed for adenocarcinoma histology), followed by immunotherapy, though given EGFR mutation, we will substitute tagrisso for maintenance therapy after concurrent chemoradaition despite PACIFIC trial results.  Recent approval based on LEE trial - Tagrisso post CCRT with significant prolongation of PFS.  Completed cisplatin/pemetrexed every 3 weeks x  3 cycles with concurrent RT, was on osimertinib maintenance therapy with plan to continue indefinitely per LEE trail results (June 2024) however, the patient developed pneumonitis and it's been on hold since 9/14  -- We will continue to withhold Tagrisso. We will re-evaluate in four weeks and plan to rechallenge with Tagrisso if she continues to improve in respiratory function.  -- C/w steroid taper and PCP prophylaxis for pneumonitis. She is currently on 25mg prednisone, tapered by 5mg each week. -- Repeat CT scheduled for 11/1/24 -- depending on CT results, plan to rechallenge with 40mg Tagrisso with follow up in 3 weeks. -- ordered labs CBC, CMP -- monitor EKG Q 3-6 months once back on Tagrisso -- labs next visit: CBC, CMP  Pulmonary embolus likely hypercoagulability in setting of malignancy -- Continue with Eliquis 5 mg twice daily  Anemia - (improved, Hb 11.2 today) --monitor

## 2024-11-22 NOTE — HISTORY OF PRESENT ILLNESS
[de-identified] : America Darby is a 58-year-old female who presents to the clinic for follow up of stage IIIB NSCLC - adenocarcinoma histology (EGFR L858R).  Onx Hx: History obtained via translation from daughters Patient noted to have a cough in September 2023, initially thought to be URI, nonresponsive to steroids, zeepack/antibiotics. Further workup (see below) showed RUL nodule with mediastinal and right hilar lymphadenopathy, and bronchoscopy performed in April confirmed diagnosis of lung adenocarcinoma.  FH Noncontributory  SH nonsmoker, second hand smoking from . No alcohol, other drugs, or other supplements [employer] - stopped working since daughters prefer her not to travel Lives in [place] Fully functional at home  2/29/24: CTX: Opacity in the medial right upper lobe, right paratracheal region.  3/22/24: CT: Medial subpleural right upper lobe mildy spiculated nodule. Mediastinal and right hilar lymphadenopathy as described, most notably a necrotic right paratracheal jean pierre conglomerate. These findings are suspicious for primary lung malignancy. Question of direct intravascular invasion involving the superior vena cava and left branchiocephalic vein.  4/6/24: PET: Right upper lobe lung mass shows significant metabolic activity and likely malignant. Bilateral supraclavicular, mediastinal and bilateral hilar adenopathy showing moderate-significant activity. Nodularity and thickening along the right major fissure in the upper lobe but no metabolic activity. Left adnexal cyst. In a postmenopausal patient, this can be followed by transvaginal ultrasound urgent basis. Bilateral cystic structures following there is sacral foramen which are probably perineural cysts amd can be further evaluated with MRI of the sacrum.  4/12/24 BAL RUL - non-small cell carcinoma, adenocarcinoma of lung origin  Results of FB EBUS biopsy performed on 04/12/2024 of the RUL bronchoalevolar lavage was positive for malignant cells and was a non-small cell carcinoma. The tracheal biopsy was an adenocarcinoma of lung origin. Lymph node 4R was positive for malignant cells and is a metastatic non-small cell carcinoma. Note: Immunostains performed shows that the tumor cells are positive Tier I: Variants of Strong Clinical Significance  EGFR p.([id number]) Tier II: Variants of Potential Clinical Significance  TP53 p.([id number]) Tier III: Variants of Unknown Clinical Significance CDKN2A p.([id number]) PD-L1 1-2% 4/29/24 MRIB - negative for metastatic disease  6/26/24: CT Chest: 1. Nonocclusive segmental branch pulmonary artery embolism involving the lateral basal segment to the left lower lobe. No evidence of pulmonary hemorrhage or infarction. No pulmonary artery dilatation or right ventricular strain. 2. Loculated right upper lobe neoplasm which may be minimally smaller in size as described above. 3. Stable confluent mediastinal lymphadenopathy with near complete occlusion of the brachiocephalic confluence and proximal IVC. The results of this examination were verbally communicated with read back to the physician, LILLIAN GREEN, on on 6/26/2024 at 12:08 PM.   8/20/24: Ct Chest:  1. Slight interval decrease in the size of a spiculated nodular neoplasm within the apical segment of the right upper lobe. 2. 2 new linear opacities within the apical segment of the right upper lobe as above. Short interval surveillance is suggested. 3. The remainder the study is unchanged with bulky mediastinal and perihilar lymphadenopathy with stable near complete slitlike occlusion of the superior vena cava and mild progressive narrowing of the right main and right upper lobe bronchus.   Ms. Darby recently presented to the Emergency Department (ED) with a 3-4 day history of dyspnea and dry cough. She was found to be hypoxic in the ED, oxygen sat of 90% on room air. CT scan revealed new bilateral symmetric consolidative and ground-glass airspace disease, predominantly in the mid and upper lung fields, raising concerns for radiation pneumonitis. She was started on high dose steroids and HFNC. A transbronchial biopsy was performed, which indicated organizing pneumonia.   She was gradually weaned down to room air, requiring 2 liters of oxygen on exertion. She was discharged on a tapering dose of steroids with Pneumocystis jirovecii pneumonia (PJP) prophylaxis.   11/1/24: CT Chest:  1. Interval resolution of perihilar crazy paving opacities with newly developing perihilar consolidation which encases the bronchovascular bundles with newly developing traction bronchiectasis. This may represent the sequela of radiation fibrosis. Possibility of lymphangitic carcinomatosis cannot be excluded. 2. Mild interval improvement in diffuse infiltration of the mediastinal fat planes which may represent a mildly improving lymphadenopathy. 3. Limited evaluation of the vessels and inferior vena cava the absence of intravenous contrast.  [de-identified] : Adenocarcinoma - PD-L1 1-2%, EGFR L858R [de-identified] : CTSx: Dr.Lee Yorkc: Dr.Wernicke [FreeTextEntry1] : 5/23/2024: C1 cisplatin/pemetrexed RT start 6/3/2024 6/13/24   C2 cisplatin/pemetrexed  7/10/2024: C3 cisplatin/pemetrexed completed  7/18/2024: Last day of RT 8/24/2024: Tagriso 80 mg Qd- [de-identified] : The patient was seen and evaluated in the office. SOB has improved and she is not requiring supplemental oxygen. She can walk for up to 20 minutes before feeling short of breath.  She is on a prednisone taper - currently 25mg, 20mg starting Thursday. She has a repeat CT due 11/1/24. Reports fingertip numbness and tingling x 2.5 weeks, which does not interfere with her ADLs. Has an ophthalmology appointment 11/12/24 to evaluate her cataracts.

## 2024-11-22 NOTE — HISTORY OF PRESENT ILLNESS
[de-identified] : America Darby is a 58-year-old female who presents to the clinic for follow up of stage IIIB NSCLC - adenocarcinoma histology (EGFR L858R).  Onx Hx: History obtained via translation from daughters Patient noted to have a cough in September 2023, initially thought to be URI, nonresponsive to steroids, zeepack/antibiotics. Further workup (see below) showed RUL nodule with mediastinal and right hilar lymphadenopathy, and bronchoscopy performed in April confirmed diagnosis of lung adenocarcinoma.  FH Noncontributory  SH nonsmoker, second hand smoking from . No alcohol, other drugs, or other supplements [employer] - stopped working since daughters prefer her not to travel Lives in [place] Fully functional at home  2/29/24: CTX: Opacity in the medial right upper lobe, right paratracheal region.  3/22/24: CT: Medial subpleural right upper lobe mildy spiculated nodule. Mediastinal and right hilar lymphadenopathy as described, most notably a necrotic right paratracheal jean pierre conglomerate. These findings are suspicious for primary lung malignancy. Question of direct intravascular invasion involving the superior vena cava and left branchiocephalic vein.  4/6/24: PET: Right upper lobe lung mass shows significant metabolic activity and likely malignant. Bilateral supraclavicular, mediastinal and bilateral hilar adenopathy showing moderate-significant activity. Nodularity and thickening along the right major fissure in the upper lobe but no metabolic activity. Left adnexal cyst. In a postmenopausal patient, this can be followed by transvaginal ultrasound urgent basis. Bilateral cystic structures following there is sacral foramen which are probably perineural cysts amd can be further evaluated with MRI of the sacrum.  4/12/24 BAL RUL - non-small cell carcinoma, adenocarcinoma of lung origin  Results of FB EBUS biopsy performed on 04/12/2024 of the RUL bronchoalevolar lavage was positive for malignant cells and was a non-small cell carcinoma. The tracheal biopsy was an adenocarcinoma of lung origin. Lymph node 4R was positive for malignant cells and is a metastatic non-small cell carcinoma. Note: Immunostains performed shows that the tumor cells are positive Tier I: Variants of Strong Clinical Significance  EGFR p.([id number]) Tier II: Variants of Potential Clinical Significance  TP53 p.([id number]) Tier III: Variants of Unknown Clinical Significance CDKN2A p.([id number]) PD-L1 1-2% 4/29/24 MRIB - negative for metastatic disease  6/26/24: CT Chest: 1. Nonocclusive segmental branch pulmonary artery embolism involving the lateral basal segment to the left lower lobe. No evidence of pulmonary hemorrhage or infarction. No pulmonary artery dilatation or right ventricular strain. 2. Loculated right upper lobe neoplasm which may be minimally smaller in size as described above. 3. Stable confluent mediastinal lymphadenopathy with near complete occlusion of the brachiocephalic confluence and proximal IVC. The results of this examination were verbally communicated with read back to the physician, LILLIAN GREEN, on on 6/26/2024 at 12:08 PM.   8/20/24: Ct Chest:  1. Slight interval decrease in the size of a spiculated nodular neoplasm within the apical segment of the right upper lobe. 2. 2 new linear opacities within the apical segment of the right upper lobe as above. Short interval surveillance is suggested. 3. The remainder the study is unchanged with bulky mediastinal and perihilar lymphadenopathy with stable near complete slitlike occlusion of the superior vena cava and mild progressive narrowing of the right main and right upper lobe bronchus.   Ms. Darby recently presented to the Emergency Department (ED) with a 3-4 day history of dyspnea and dry cough. She was found to be hypoxic in the ED, oxygen sat of 90% on room air. CT scan revealed new bilateral symmetric consolidative and ground-glass airspace disease, predominantly in the mid and upper lung fields, raising concerns for radiation pneumonitis. She was started on high dose steroids and HFNC. A transbronchial biopsy was performed, which indicated organizing pneumonia.   She was gradually weaned down to room air, requiring 2 liters of oxygen on exertion. She was discharged on a tapering dose of steroids with Pneumocystis jirovecii pneumonia (PJP) prophylaxis.   11/1/24: CT Chest:  1. Interval resolution of perihilar crazy paving opacities with newly developing perihilar consolidation which encases the bronchovascular bundles with newly developing traction bronchiectasis. This may represent the sequela of radiation fibrosis. Possibility of lymphangitic carcinomatosis cannot be excluded. 2. Mild interval improvement in diffuse infiltration of the mediastinal fat planes which may represent a mildly improving lymphadenopathy. 3. Limited evaluation of the vessels and inferior vena cava the absence of intravenous contrast.  [de-identified] : Adenocarcinoma - PD-L1 1-2%, EGFR L858R [de-identified] : CTSx: Dr.Lee Yorkc: Dr.Wernicke [FreeTextEntry1] : 5/23/2024: C1 cisplatin/pemetrexed RT start 6/3/2024 6/13/24   C2 cisplatin/pemetrexed  7/10/2024: C3 cisplatin/pemetrexed completed  7/18/2024: Last day of RT 8/24/2024: Tagriso 80 mg Qd- [de-identified] : The patient was seen and evaluated in the office. SOB has improved and she is not requiring supplemental oxygen. She can walk for up to 20 minutes before feeling short of breath.  She is on a prednisone taper - currently 25mg, 20mg starting Thursday. She has a repeat CT due 11/1/24. Reports fingertip numbness and tingling x 2.5 weeks, which does not interfere with her ADLs. Has an ophthalmology appointment 11/12/24 to evaluate her cataracts.

## 2024-11-22 NOTE — ASSESSMENT
[FreeTextEntry1] : America Darby is a 58-year-old female with no significant prior PMH who presents for follow up of for lung adenocarcinoma, [id number] - Stage IIIB, EGFR L858R.   Stage IIIB Lung Adenocarcinoma with supraclavicular, mediastinal and bilateral hilar adenopathy - EGFR exon 21 L858R mutation positive on NGS - PET/CT on 04/06/2024 - RUL lung mass shows significant metabolic activity, B/L supraclavicular, mediastinal and b/l hilar adenopathy showing moderate-significant metabolic activity; Nodularity and thickening along the right major fissure in the upper lobe but no metabolic activity; Left adnexal cyst.;B/L cystic structures following there is sacral foramen which are probably perineural cysts and can be further evaluated with MRI of the sacrum. - 4/12/2024 - FB EBUS biopsy on 04/12/2024 - tandard of care treatment for stage IIIB lung cancer is concurrent chemoradiation (prefer cisplatin/pemetrexed for adenocarcinoma histology), followed by immunotherapy, though given EGFR mutation, we will substitute tagrisso for maintenance therapy after concurrent chemoradaition despite PACIFIC trial results.  Recent approval based on LEE trial - Tagrisso post CCRT with significant prolongation of PFS.  Completed cisplatin/pemetrexed every 3 weeks x  3 cycles with concurrent RT, was on osimertinib maintenance therapy with plan to continue indefinitely per LEE trail results (June 2024) however, the patient developed pneumonitis and it's been on hold since 9/14  -- We will continue to withhold Tagrisso. We will re-evaluate in four weeks and plan to rechallenge with Tagrisso if she continues to improve in respiratory function.  -- C/w steroid taper and PCP prophylaxis for pneumonitis. She is currently on 25mg prednisone, tapered by 5mg each week. -- Repeat CT scheduled for 11/1/24 -- depending on CT results, plan to rechallenge with 40mg Tagrisso with follow up in 3 weeks. -- ordered labs CBC, CMP -- monitor EKG Q 3-6 months once back on Tagrisso -- labs next visit: CBC, CMP  Pulmonary embolus likely hypercoagulability in setting of malignancy -- Continue with Eliquis 5 mg twice daily  Anemia - (improved, Hb 11.2 today) --monitor

## 2024-11-22 NOTE — ASSESSMENT
[FreeTextEntry1] : America Darby is a 58-year-old female with no significant prior PMH who presents for follow up of for lung adenocarcinoma, [id number] - Stage IIIB, EGFR L858R.   Stage IIIB Lung Adenocarcinoma with supraclavicular, mediastinal and bilateral hilar adenopathy - EGFR exon 21 L858R mutation positive on NGS - PET/CT on 04/06/2024 - RUL lung mass shows significant metabolic activity, B/L supraclavicular, mediastinal and b/l hilar adenopathy showing moderate-significant metabolic activity; Nodularity and thickening along the right major fissure in the upper lobe but no metabolic activity; Left adnexal cyst.;B/L cystic structures following there is sacral foramen which are probably perineural cysts and can be further evaluated with MRI of the sacrum. - 4/12/2024 - FB EBUS biopsy on 04/12/2024 - tandard of care treatment for stage IIIB lung cancer is concurrent chemoradiation (prefer cisplatin/pemetrexed for adenocarcinoma histology), followed by immunotherapy, though given EGFR mutation, we will substitute tagrisso for maintenance therapy after concurrent chemoradaition despite PACIFIC trial results.  Recent approval based on LEE trial - Tagrisso post CCRT with significant prolongation of PFS.  Completed cisplatin/pemetrexed every 3 weeks x  3 cycles with concurrent RT, was on osimertinib maintenance therapy with plan to continue indefinitely per LEE trail results (June 2024) however, the patient developed pneumonitis and it's been on hold since 9/14  -- We will continue to withhold Tagrisso. We will re-evaluate in four weeks and plan to rechallenge with Tagrisso if she continues to improve in respiratory function.  -- C/w steroid taper and PCP prophylaxis for pneumonitis. She is currently on 25mg prednisone, tapered by 5mg each week. -- Repeat CT scheduled for 11/1/24 -- depending on CT results, plan to rechallenge with 40mg Tagrisso with follow up in 3 weeks. -- ordered labs CBC, CMP -- monitor EKG Q 3-6 months once back on Tagrisso -- labs next visit: CBC, CMP  Pulmonary embolus likely hypercoagulability in setting of malignancy -- Continue with Eliquis 5 mg twice daily  Anemia - (improved, Hb 11.2 today) --monitor

## 2024-11-26 NOTE — REVIEW OF SYSTEMS
[Vision Problems] : vision problems [Cough] : cough [SOB on Exertion] : shortness of breath during exertion

## 2024-12-18 NOTE — END OF VISIT
[] : Fellow [FreeTextEntry3] : Patient seen with oncology fellow, Dr.Anamika Gates.  57 yo F with stage IIIB NSCLC, EGFR L858R, s/p chemoRT completed 7/16, followed by adjuvant Tagrisso 80 mg QD per LEE trial here for follow up post hospitalization. --diagnosed with pneumonitis - either radiation induced vs Tagrisso --on prednisone taper per , now only on 5mg --tolerating dose reduced Tagrisso well, most of history obtained from patient's daughter Tatyana -- She is symptomatically significantly improved, finally off oxygen able to walk at least 30 minutes without shortness of breath -- ordered CBC, CMP --patient is therapy with Tagrisso requiring intensive monitoring for toxicity, such as liver toxicity with CBC, CMP at this time every 2 wks --patient was advised to hold off any hepatotoxic medications --discussed with Dr.Brett Nieto. Given her syptomatic improvent he is recommending taper off steroids now. --repeat CT Chest in Feb, with follow up after

## 2024-12-18 NOTE — END OF VISIT
[] : Fellow [FreeTextEntry3] : Patient seen with oncology fellow, Dr.Anamika Gates.  59 yo F with stage IIIB NSCLC, EGFR L858R, s/p chemoRT completed 7/16, followed by adjuvant Tagrisso 80 mg QD per LEE trial here for follow up post hospitalization. --diagnosed with pneumonitis - either radiation induced vs Tagrisso --on prednisone taper per , now only on 5mg --tolerating dose reduced Tagrisso well, most of history obtained from patient's daughter Tatyana -- She is symptomatically significantly improved, finally off oxygen able to walk at least 30 minutes without shortness of breath -- ordered CBC, CMP --patient is therapy with Tagrisso requiring intensive monitoring for toxicity, such as liver toxicity with CBC, CMP at this time every 2 wks --patient was advised to hold off any hepatotoxic medications --discussed with Dr.Brett Nieto. Given her syptomatic improvent he is recommending taper off steroids now. --repeat CT Chest in Feb, with follow up after

## 2024-12-18 NOTE — REVIEW OF SYSTEMS
[GERD] : gerd [Negative] : Endocrine [Cough] : cough [SOB on Exertion] : sob on exertion [TextBox_3] : Fatigue [TextBox_30] : Dry cough; exertional dyspnea

## 2024-12-18 NOTE — PHYSICAL EXAM
[Normal] : affect appropriate [de-identified] : Tachycardic. Regular rhythm. No MRG. [de-identified] : EOMI, no conjunctival injection, anicteric [de-identified] : + moon facies [de-identified] : No calf tenderness or swelling.

## 2024-12-18 NOTE — ASSESSMENT
[FreeTextEntry1] : America Darby is a 58-year-old female with no significant prior PMH who presents for follow up of for lung adenocarcinoma, [id number] - Stage IIIB, EGFR L858R.   Stage IIIB Lung Adenocarcinoma with supraclavicular, mediastinal and bilateral hilar adenopathy - EGFR exon 21 L858R mutation positive on NGS - PET/CT on 04/06/2024 - RUL lung mass shows significant metabolic activity, B/L supraclavicular, mediastinal and b/l hilar adenopathy showing moderate-significant metabolic activity; Nodularity and thickening along the right major fissure in the upper lobe but no metabolic activity; Left adnexal cyst.;B/L cystic structures following there is sacral foramen which are probably perineural cysts and can be further evaluated with MRI of the sacrum. - 4/12/2024 - FB EBUS biopsy on 04/12/2024 - tandard of care treatment for stage IIIB lung cancer is concurrent chemoradiation (prefer cisplatin/pemetrexed for adenocarcinoma histology), followed by immunotherapy, though given EGFR mutation, we will substitute tagrisso for maintenance therapy after concurrent chemoradaition despite PACIFIC trial results.  Recent approval based on LEE trial - Tagrisso post CCRT with significant prolongation of PFS.  Completed cisplatin/pemetrexed every 3 weeks x  3 cycles with concurrent RT, was on osimertinib maintenance therapy with plan to continue indefinitely per LEE trail results (June 2024) however, the patient developed pneumonitis and treatment was held from 9/14/24-11/14/24. Now rechallenging at Tagrisso 40mg daily, tolerating well.  --Tagrisso was held (9/14/24-11/14/24) for pneumonitis, now resolving --CT chest 11/1/24 showed stable disease --rechallenging Tagrisso 40mg daily (started 11/15/24), tolerating well --Labs notable for grade 1 ALT elevation, possibly 2/2 Tagrisso. Bilirubin wnl. --will hold off on Tagrisso escalation at this time; continue current dose --monitor EKG q3-6 months (to be done at next visit)  Pulmonary embolus likely hypercoagulability in setting of malignancy --continue Eliquis 5mg BID  Anemia [RESOLVED]  --Will continue to monitor   RTC in 2 months.  Please see attending physician attestation below.

## 2024-12-18 NOTE — REVIEW OF SYSTEMS
[Vision Problems] : vision problems [Cough] : cough [SOB on Exertion] : shortness of breath during exertion [Fever] : no fever [Chills] : no chills [Night Sweats] : no night sweats [Dysphagia] : no dysphagia [Chest Pain] : no chest pain [Palpitations] : no palpitations [Shortness Of Breath] : no shortness of breath [Wheezing] : no wheezing [Abdominal Pain] : no abdominal pain [Vomiting] : no vomiting [Skin Rash] : no skin rash [Dizziness] : no dizziness [Fainting] : no fainting [FreeTextEntry2] : + weight gain [de-identified] : + dry skin

## 2024-12-18 NOTE — PHYSICAL EXAM
[Normal] : affect appropriate [de-identified] : Tachycardic. Regular rhythm. No MRG. [de-identified] : EOMI, no conjunctival injection, anicteric [de-identified] : + moon facies [de-identified] : No calf tenderness or swelling.

## 2024-12-18 NOTE — HISTORY OF PRESENT ILLNESS
[TextBox_4] : Ms. America Darby returned to clinic today in follow-up for lung cancer with treatment-related pneumonitis; she is accompanied by her daughter and son-in-law, who supplements her history. In review, Ms. Darby is a 59 yo F h/o NSCLC (adenocarcinoma, stage IIIB (RUL, tracheal involvement, mediastinal and supraclavicular lymph nodes; EGFR mutation) s/p concurrent chemoradiation with adjuvant Osimertinib who was hospitalized in 9/2024 for cough, progressive exertional dyspnea, and acute hypoxemic respiratory failure. She underwent bronchoscopy that ruled out alveolar hemorrhage; organizing pneumonia on biopsy and the clinical presentation favored drug related pneumonitis. With 2 mg/kg Methylprednisolone, she improved. A small R pleural effusion was noted during hospitalization, though not amenable to thoracentesis.  10/2024: Since discharge, Ms. Darby has felt steadily better. She is no longer using supplemental oxygen. She is able to walk at a normal pace for 30 minutes before having to rest. Her appetite and weight are stable. She is not having cough. With Prednisone, she is having some epigastric burning.  10/2024: Ms. Darby has continued to feel better on her steroid taper, currently at Prednisone 25 mg PO daily. She has remained active via walking 30 minutes/day. She has some dyspnea when walking up hills. Her appetite and weight are stable. She has noticed some blurriness in her vision, which she associates with cataracts. She has an Ophthalmology visit next week.  12/18/24 She is doing well she restarted the Osimertinib about a month ago and stopped the prednisone about a week ago. She feels well about the same without any worsening of her symptoms. She walks everyday for about 30 minutes but gets short of breath after about 3 flights of stairs. Denies any recent fevers, chills, infections. Still has a dry cough that comes and goes without any predilection for time of day.   Cancer treatment course 5/23/2024: C1 cisplatin/pemetrexed RT start 6/3/2024 6/13/24 C2 cisplatin/pemetrexed 7/10/2024: C3 cisplatin/pemetrexed completed 7/18/2024: Last day of RT 8/24/2024: Osimertinib 80 mg Qd-.  PMH: NSCLC PE (LLL; 7/2024) Afib s/p ablation  SH: Never smoker. History of second-hand smoke exposure.

## 2024-12-18 NOTE — ASSESSMENT
[FreeTextEntry1] : Labs: 10/2024: WBC 12.6, Hgb 9.9, Plts 273 Na 133 K 4.1, Cl 102, HCO3 28, BUN/creat 18/1.00, glucose 167, Ca 9.1 Tbili 0.6, AST/ALT 63/59, Alk phos 66, TP/Albumin 6.4/2.8  Bronchoscopy (4/2024): BAL: POSITIVE FOR MALIGNANT CELLS. Non- small cell carcinoma.  Final Diagnosis Trachea, biopsy and touch preparation: -Adenocarcinoma of lung origin. Note: Immunostains performed shows that the tumor cells are positive for TTF-1 and negative for p40.  This  staining profile supports the diagnosis.  Final Diagnosis LYMPH NODE, 4R, EBUS-GUIDED FNA POSITIVE FOR MALIGNANT CELLS. Metastatic non-small cell carcinoma. Cell block shows similar findings. Positive EGFR mutation  Bronchoscopy (9/2024): WBC 38 (L15, P12, M8, Eos 3)  BRONCHOALVEOLAR LAVAGE, RIGHT, LOWER LOBE Final Diagnosis LUNG, RIGHT LOWER LOBE, BAL: NEGATIVE FOR MALIGNANT CELLS Benign bronchial cells, alveolar macrophages, inflammatory cells. The cellblock shows similar findings.  Transbronchial biopsy: Final Diagnosis 1. Lung , right lower lobe, biopsy: - A minute fragment of lung tissue with reactive changes and focal areas suggestive of an organizing pneumonia.  Bacterial culture: negative AFB culture: NGTD Fungus culture: NGTD  BAL PJP PCR negative.  PET/CT (4/2024): Right upper lobe lung mass shows significant metabolic activity and likely malignant. Bilateral supraclavicular, mediastinal and bilateral hilar adenopathy showing moderate-significant activity. Nodularity and thickening along the right major fissure in the upper lobe but no metabolic activity. Left adnexal cyst. In a postmenopausal patient, this can be followed by transvaginal ultrasound urgent basis. Bilateral cystic structures following there is sacral foramen which are probably perineural cysts and can be further evaluated with MRI of the sacrum.  Chest CT (6/26/24) 1. Nonocclusive segmental branch pulmonary artery embolism involving the lateral basal segment to the left lower lobe. No evidence of pulmonary hemorrhage or infarction. No pulmonary artery dilatation or right ventricular strain. Loculated right upper lobe neoplasm which may be minimally smaller in size. Stable confluent mediastinal lymphadenopathy with near complete occlusion of the brachiocephalic confluence and proximal IVC  Ct Chest (8/2024): 1. Slight interval decrease in the size of a spiculated nodular neoplasm within the apical segment of the right upper lobe. 2. 2 new linear opacities within the apical segment of the right upper lobe as above. Short interval surveillance is suggested. 3. The remainder the study is unchanged with bulky mediastinal and perihilar lymphadenopathy with stable near complete slitlike occlusion of the superior vena cava and mild progressive narrowing of the right main and right upper lobe bronchus.  CT PE (9/2024): IMPRESSION: 1. No pulmonary embolism. 2. Since August 20, 2024, new bilateral symmetric consolidative and groundglass airspace disease with mid and upper lung field predominance. Differential for this pattern includes noncardiogenic pulmonary edema (including ARDS), hypersensitivity pneumonitis, cardiogenic pulmonary edema/CHF, pulmonary alveolar proteinosis, diffuse alveolar hemorrhage, or atypical pneumonia (including Pneumocystis jirovecii). 3. Mildly increased right pleural effusion.  Chest CT (11/2024): IMPRESSION: 1. Interval resolution of perihilar crazy paving opacities with newly developing perihilar consolidation which encases the bronchovascular bundles with newly developing traction bronchiectasis. This may represent the sequela of radiation fibrosis. Possibility of lymphangitic carcinomatosis cannot be excluded. 2. Mild interval improvement in diffuse infiltration of the mediastinal fat planes which may represent a mildly improving lymphadenopathy. 3. Limited evaluation of the vessels and inferior vena cava the absence of intravenous contrast.  PFTs             12/18/24 FEV1/FVC     70% FEV1             1.39, 62% FVC               2.00, 69% TLC               - RV                 - DLCO            7.78. 38% DLadj            42% -12/2024: Positive bronchodilator response (FEV1 improved 160mL, 13%; FEV1/FVC normalizes)  6MWT  12/2024: 402 meters (1319 feet), fartun SpO2 94% on room air  A/P: 57 yo F h/o stage IIIB NSCLC (RUL mass, tracheal involvement N2 and N3 lymph nodes) s/p concurrent chemoradiation and Osimertinib), recent PE, and Afib returns to clinic after a recent hospitalization for cancer treatment-related pneumonitis.  Ms. Darby is clinically improving. Her PFTs show moderate obstruction and reduced DLCO. Her oxygenation is not worsening with exertion. We will obtain a CXR. If no evidence of disease recurrence, we will plan a repeat chest CT in 2/2025. For her persistent cough, I suspect obstructive lung disease vs resolving cancer treatment related pneumonitis. We will start LABA/ICS as a trial.  I spoke to Dr. Ahuja, and restarted lower dose Osimertinib about 1 month ago and weaned off prednisone about a week ago. PFTs with obstructive physiology with severely reduced DLCO. Plan for repeat CT scan in 3 months prior to next visit. PFTs before next visit.  For the PE, she remains on Apixaban. Given her malignancy, I would favor an extended course.  1. Cancer treatment-related pneumonitis: radiation vs chemotherapy vs Osimertinib 2. NSCLC (stage IIIB) with EGFR mutation 3. Fatigue 4. h/o PE (6/2024) 5. Moderate COPD  -CXR today -LABA/ICS -CXR today -Famotidine 20 mg PO daily -continue Osimertinib at 40 mg -Calcium and Vitamin D supplementation -continue Apixaban 5 mg PO BID -recommended regular physical activity -follow-up with me in 1-2 months with PFTs

## 2024-12-18 NOTE — HISTORY OF PRESENT ILLNESS
[Disease: _____________________] : Disease: [unfilled] [T: ___] : T[unfilled] [N: ___] : N[unfilled] [M: ___] : M[unfilled] [AJCC Stage: ____] : AJCC Stage: [unfilled] [90: Able to carry normal activity; minor signs or symptoms of disease.] : 90: Able to carry normal activity; minor signs or symptoms of disease.  [ECOG Performance Status: 1 - Restricted in physically strenuous activity but ambulatory and able to carry out work of a light or sedentary nature] : Performance Status: 1 - Restricted in physically strenuous activity but ambulatory and able to carry out work of a light or sedentary nature, e.g., light house work, office work [de-identified] : America Darby is a 58-year-old female who presents to the clinic for follow up of stage IIIB NSCLC - adenocarcinoma histology (EGFR L858R).  Onx Hx: History obtained via translation from daughters Patient noted to have a cough in September 2023, initially thought to be URI, nonresponsive to steroids, zeepack/antibiotics. Further workup (see below) showed RUL nodule with mediastinal and right hilar lymphadenopathy, and bronchoscopy performed in April confirmed diagnosis of lung adenocarcinoma.  FH Noncontributory  SH nonsmoker, second hand smoking from . No alcohol, other drugs, or other supplements [facility] - stopped working since daughters prefer her not to travel Lives in [place] Fully functional at home  2/29/24: CTX: Opacity in the medial right upper lobe, right paratracheal region.  3/22/24: CT: Medial subpleural right upper lobe mildy spiculated nodule. Mediastinal and right hilar lymphadenopathy as described, most notably a necrotic right paratracheal jean pierre conglomerate. These findings are suspicious for primary lung malignancy. Question of direct intravascular invasion involving the superior vena cava and left branchiocephalic vein.  4/6/24: PET: Right upper lobe lung mass shows significant metabolic activity and likely malignant. Bilateral supraclavicular, mediastinal and bilateral hilar adenopathy showing moderate-significant activity. Nodularity and thickening along the right major fissure in the upper lobe but no metabolic activity. Left adnexal cyst. In a postmenopausal patient, this can be followed by transvaginal ultrasound urgent basis. Bilateral cystic structures following there is sacral foramen which are probably perineural cysts amd can be further evaluated with MRI of the sacrum.  4/12/24 BAL RUL - non-small cell carcinoma, adenocarcinoma of lung origin  Results of FB EBUS biopsy performed on 04/12/2024 of the RUL bronchoalevolar lavage was positive for malignant cells and was a non-small cell carcinoma. The tracheal biopsy was an adenocarcinoma of lung origin. Lymph node 4R was positive for malignant cells and is a metastatic non-small cell carcinoma. Note: Immunostains performed shows that the tumor cells are positive Tier I: Variants of Strong Clinical Significance  EGFR p.([id number]) Tier II: Variants of Potential Clinical Significance  TP53 p.([id number]) Tier III: Variants of Unknown Clinical Significance CDKN2A p.([id number]) PD-L1 1-2% 4/29/24 MRIB - negative for metastatic disease  6/26/24: CT Chest: 1. Nonocclusive segmental branch pulmonary artery embolism involving the lateral basal segment to the left lower lobe. No evidence of pulmonary hemorrhage or infarction. No pulmonary artery dilatation or right ventricular strain. 2. Loculated right upper lobe neoplasm which may be minimally smaller in size as described above. 3. Stable confluent mediastinal lymphadenopathy with near complete occlusion of the brachiocephalic confluence and proximal IVC. The results of this examination were verbally communicated with read back to the physician, LILLIAN GREEN, on on 6/26/2024 at 12:08 PM.   8/20/24: Ct Chest:  1. Slight interval decrease in the size of a spiculated nodular neoplasm within the apical segment of the right upper lobe. 2. 2 new linear opacities within the apical segment of the right upper lobe as above. Short interval surveillance is suggested. 3. The remainder the study is unchanged with bulky mediastinal and perihilar lymphadenopathy with stable near complete slitlike occlusion of the superior vena cava and mild progressive narrowing of the right main and right upper lobe bronchus.   Ms. Darby recently presented to the Emergency Department (ED) with a 3-4 day history of dyspnea and dry cough. She was found to be hypoxic in the ED, oxygen sat of 90% on room air. CT scan revealed new bilateral symmetric consolidative and ground-glass airspace disease, predominantly in the mid and upper lung fields, raising concerns for radiation pneumonitis. She was started on high dose steroids and HFNC. A transbronchial biopsy was performed, which indicated organizing pneumonia.   She was gradually weaned down to room air, requiring 2 liters of oxygen on exertion. She was discharged on a tapering dose of steroids with Pneumocystis jirovecii pneumonia (PJP) prophylaxis.   11/1/24: CT Chest:  1. Interval resolution of perihilar crazy paving opacities with newly developing perihilar consolidation which encases the bronchovascular bundles with newly developing traction bronchiectasis. This may represent the sequela of radiation fibrosis. Possibility of lymphangitic carcinomatosis cannot be excluded. 2. Mild interval improvement in diffuse infiltration of the mediastinal fat planes which may represent a mildly improving lymphadenopathy. 3. Limited evaluation of the vessels and inferior vena cava the absence of intravenous contrast.  [de-identified] : Adenocarcinoma - PD-L1 1-2%, EGFR L858R [de-identified] : CTSx: Dr.Lee Yorkc: Dr.Wernicke [FreeTextEntry1] : 5/23/24: C1 cisplatin/pemetrexed RT start 6/3/2024 6/13/24   C2 cisplatin/pemetrexed  7/10/24: C3 cisplatin/pemetrexed completed  7/18/24: Last day of RT 8/24/24: Tagrisso 80 mg daily 9/14/24: Tagrisso HELD for pneumonitis 11/15/24: restarted Tagrisso 40mg (reduced dose) [de-identified] : The patient presents for follow-up, with her daughter Tatyana on call who helped interpreting in [facility]. Tagrisso was held from 9/14-11/14 for pneumonitis. She had an interval CT scan on 11/1/24, which showed radiation fibrosis changes. She is following Dr. Nieto and is scheduled to see him tomorrow.  Tagrisso was restarted at 40mg daily, since 11/15/24. She has tolerated it well. Complains of her baseline XIONG, but can walk for 30 minutes without interruption. Occasional nonproductive cough, a few times per hour, which is greatly improved from prior. No f/s/c, n/v/d, decreased appetite, or rash. Notes weight gain since starting prednisone.

## 2024-12-18 NOTE — ASSESSMENT
[FreeTextEntry1] : Labs: 10/2024: WBC 12.6, Hgb 9.9, Plts 273 Na 133 K 4.1, Cl 102, HCO3 28, BUN/creat 18/1.00, glucose 167, Ca 9.1 Tbili 0.6, AST/ALT 63/59, Alk phos 66, TP/Albumin 6.4/2.8  Bronchoscopy (4/2024): BAL: POSITIVE FOR MALIGNANT CELLS. Non- small cell carcinoma.  Final Diagnosis Trachea, biopsy and touch preparation: -Adenocarcinoma of lung origin. Note: Immunostains performed shows that the tumor cells are positive for TTF-1 and negative for p40.  This  staining profile supports the diagnosis.  Final Diagnosis LYMPH NODE, 4R, EBUS-GUIDED FNA POSITIVE FOR MALIGNANT CELLS. Metastatic non-small cell carcinoma. Cell block shows similar findings. Positive EGFR mutation  Bronchoscopy (9/2024): WBC 38 (L15, P12, M8, Eos 3)  BRONCHOALVEOLAR LAVAGE, RIGHT, LOWER LOBE Final Diagnosis LUNG, RIGHT LOWER LOBE, BAL: NEGATIVE FOR MALIGNANT CELLS Benign bronchial cells, alveolar macrophages, inflammatory cells. The cellblock shows similar findings.  Transbronchial biopsy: Final Diagnosis 1. Lung , right lower lobe, biopsy: - A minute fragment of lung tissue with reactive changes and focal areas suggestive of an organizing pneumonia.  Bacterial culture: negative AFB culture: NGTD Fungus culture: NGTD  BAL PJP PCR negative.  PET/CT (4/2024): Right upper lobe lung mass shows significant metabolic activity and likely malignant. Bilateral supraclavicular, mediastinal and bilateral hilar adenopathy showing moderate-significant activity. Nodularity and thickening along the right major fissure in the upper lobe but no metabolic activity. Left adnexal cyst. In a postmenopausal patient, this can be followed by transvaginal ultrasound urgent basis. Bilateral cystic structures following there is sacral foramen which are probably perineural cysts and can be further evaluated with MRI of the sacrum.  Chest CT (6/26/24) 1. Nonocclusive segmental branch pulmonary artery embolism involving the lateral basal segment to the left lower lobe. No evidence of pulmonary hemorrhage or infarction. No pulmonary artery dilatation or right ventricular strain. Loculated right upper lobe neoplasm which may be minimally smaller in size. Stable confluent mediastinal lymphadenopathy with near complete occlusion of the brachiocephalic confluence and proximal IVC  Ct Chest (8/2024): 1. Slight interval decrease in the size of a spiculated nodular neoplasm within the apical segment of the right upper lobe. 2. 2 new linear opacities within the apical segment of the right upper lobe as above. Short interval surveillance is suggested. 3. The remainder the study is unchanged with bulky mediastinal and perihilar lymphadenopathy with stable near complete slitlike occlusion of the superior vena cava and mild progressive narrowing of the right main and right upper lobe bronchus.  CT PE (9/2024): IMPRESSION: 1. No pulmonary embolism. 2. Since August 20, 2024, new bilateral symmetric consolidative and groundglass airspace disease with mid and upper lung field predominance. Differential for this pattern includes noncardiogenic pulmonary edema (including ARDS), hypersensitivity pneumonitis, cardiogenic pulmonary edema/CHF, pulmonary alveolar proteinosis, diffuse alveolar hemorrhage, or atypical pneumonia (including Pneumocystis jirovecii). 3. Mildly increased right pleural effusion.  Chest CT (11/2024): IMPRESSION: 1. Interval resolution of perihilar crazy paving opacities with newly developing perihilar consolidation which encases the bronchovascular bundles with newly developing traction bronchiectasis. This may represent the sequela of radiation fibrosis. Possibility of lymphangitic carcinomatosis cannot be excluded. 2. Mild interval improvement in diffuse infiltration of the mediastinal fat planes which may represent a mildly improving lymphadenopathy. 3. Limited evaluation of the vessels and inferior vena cava the absence of intravenous contrast.  PFTs             12/18/24 FEV1/FVC     70% FEV1             1.39, 62% FVC               2.00, 69% TLC               - RV                 - DLCO            7.78. 38% DLadj            42% -12/2024: Positive bronchodilator response (FEV1 improved 160mL, 13%; FEV1/FVC normalizes)  6MWT  12/2024: 402 meters (1319 feet), fartun SpO2 94% on room air  A/P: 59 yo F h/o stage IIIB NSCLC (RUL mass, tracheal involvement N2 and N3 lymph nodes) s/p concurrent chemoradiation and Osimertinib), recent PE, and Afib returns to clinic after a recent hospitalization for cancer treatment-related pneumonitis.  Ms. Darby is clinically improving. Her PFTs show moderate obstruction and reduced DLCO. Her oxygenation is not worsening with exertion. We will obtain a CXR. If no evidence of disease recurrence, we will plan a repeat chest CT in 2/2025. For her persistent cough, I suspect obstructive lung disease vs resolving cancer treatment related pneumonitis. We will start LABA/ICS as a trial.  I spoke to Dr. Ahuja, and restarted lower dose Osimertinib about 1 month ago and weaned off prednisone about a week ago. PFTs with obstructive physiology with severely reduced DLCO. Plan for repeat CT scan in 3 months prior to next visit. PFTs before next visit.  For the PE, she remains on Apixaban. Given her malignancy, I would favor an extended course.  1. Cancer treatment-related pneumonitis: radiation vs chemotherapy vs Osimertinib 2. NSCLC (stage IIIB) with EGFR mutation 3. Fatigue 4. h/o PE (6/2024) 5. Moderate COPD  -CXR today -LABA/ICS -CXR today -Famotidine 20 mg PO daily -continue Osimertinib at 40 mg -Calcium and Vitamin D supplementation -continue Apixaban 5 mg PO BID -recommended regular physical activity -follow-up with me in 1-2 months with PFTs

## 2024-12-18 NOTE — REVIEW OF SYSTEMS
[Vision Problems] : vision problems [Cough] : cough [SOB on Exertion] : shortness of breath during exertion [Fever] : no fever [Chills] : no chills [Night Sweats] : no night sweats [Dysphagia] : no dysphagia [Chest Pain] : no chest pain [Palpitations] : no palpitations [Shortness Of Breath] : no shortness of breath [Wheezing] : no wheezing [Abdominal Pain] : no abdominal pain [Vomiting] : no vomiting [Dizziness] : no dizziness [Skin Rash] : no skin rash [Fainting] : no fainting [FreeTextEntry2] : + weight gain [de-identified] : + dry skin

## 2024-12-18 NOTE — PHYSICAL EXAM
[No Acute Distress] : no acute distress [Normal Appearance] : normal appearance [Normal Rate/Rhythm] : normal rate/rhythm [Normal S1, S2] : normal s1, s2 [No Murmurs] : no murmurs [No Resp Distress] : no resp distress [No Abnormalities] : no abnormalities [Benign] : benign [Normal Gait] : normal gait [No Clubbing] : no clubbing [No Cyanosis] : no cyanosis [No Edema] : no edema [FROM] : FROM [No Focal Deficits] : no focal deficits [Normal Color/ Pigmentation] : normal color/ pigmentation [Oriented x3] : oriented x3 [Normal Affect] : normal affect [TextBox_68] : Reduced breath sounds in b/l bases; scattered inspiratory crackles; no wheezing/rhonchi. [TextBox_11] : NC/AT

## 2024-12-18 NOTE — REVIEW OF SYSTEMS
[Vision Problems] : vision problems [Cough] : cough [SOB on Exertion] : shortness of breath during exertion [Fever] : no fever [Chills] : no chills [Night Sweats] : no night sweats [Dysphagia] : no dysphagia [Chest Pain] : no chest pain [Palpitations] : no palpitations [Shortness Of Breath] : no shortness of breath [Wheezing] : no wheezing [Abdominal Pain] : no abdominal pain [Vomiting] : no vomiting [Dizziness] : no dizziness [Skin Rash] : no skin rash [Fainting] : no fainting [FreeTextEntry2] : + weight gain [de-identified] : + dry skin

## 2024-12-18 NOTE — PHYSICAL EXAM
[No Acute Distress] : no acute distress [Normal Appearance] : normal appearance [Normal Rate/Rhythm] : normal rate/rhythm [Normal S1, S2] : normal s1, s2 [No Murmurs] : no murmurs [No Resp Distress] : no resp distress [No Abnormalities] : no abnormalities [Benign] : benign [Normal Gait] : normal gait [No Clubbing] : no clubbing [No Cyanosis] : no cyanosis [No Edema] : no edema [FROM] : FROM [No Focal Deficits] : no focal deficits [Normal Color/ Pigmentation] : normal color/ pigmentation [Normal Affect] : normal affect [Oriented x3] : oriented x3 [TextBox_68] : Reduced breath sounds in b/l bases; scattered inspiratory crackles; no wheezing/rhonchi. [TextBox_11] : NC/AT other

## 2024-12-18 NOTE — HISTORY OF PRESENT ILLNESS
[Disease: _____________________] : Disease: [unfilled] [T: ___] : T[unfilled] [N: ___] : N[unfilled] [M: ___] : M[unfilled] [AJCC Stage: ____] : AJCC Stage: [unfilled] [90: Able to carry normal activity; minor signs or symptoms of disease.] : 90: Able to carry normal activity; minor signs or symptoms of disease.  [ECOG Performance Status: 1 - Restricted in physically strenuous activity but ambulatory and able to carry out work of a light or sedentary nature] : Performance Status: 1 - Restricted in physically strenuous activity but ambulatory and able to carry out work of a light or sedentary nature, e.g., light house work, office work [de-identified] : America Darby is a 58-year-old female who presents to the clinic for follow up of stage IIIB NSCLC - adenocarcinoma histology (EGFR L858R).  Onx Hx: History obtained via translation from daughters Patient noted to have a cough in September 2023, initially thought to be URI, nonresponsive to steroids, zeepack/antibiotics. Further workup (see below) showed RUL nodule with mediastinal and right hilar lymphadenopathy, and bronchoscopy performed in April confirmed diagnosis of lung adenocarcinoma.  FH Noncontributory  SH nonsmoker, second hand smoking from . No alcohol, other drugs, or other supplements [facility] - stopped working since daughters prefer her not to travel Lives in [place] Fully functional at home  2/29/24: CTX: Opacity in the medial right upper lobe, right paratracheal region.  3/22/24: CT: Medial subpleural right upper lobe mildy spiculated nodule. Mediastinal and right hilar lymphadenopathy as described, most notably a necrotic right paratracheal jean pierre conglomerate. These findings are suspicious for primary lung malignancy. Question of direct intravascular invasion involving the superior vena cava and left branchiocephalic vein.  4/6/24: PET: Right upper lobe lung mass shows significant metabolic activity and likely malignant. Bilateral supraclavicular, mediastinal and bilateral hilar adenopathy showing moderate-significant activity. Nodularity and thickening along the right major fissure in the upper lobe but no metabolic activity. Left adnexal cyst. In a postmenopausal patient, this can be followed by transvaginal ultrasound urgent basis. Bilateral cystic structures following there is sacral foramen which are probably perineural cysts amd can be further evaluated with MRI of the sacrum.  4/12/24 BAL RUL - non-small cell carcinoma, adenocarcinoma of lung origin  Results of FB EBUS biopsy performed on 04/12/2024 of the RUL bronchoalevolar lavage was positive for malignant cells and was a non-small cell carcinoma. The tracheal biopsy was an adenocarcinoma of lung origin. Lymph node 4R was positive for malignant cells and is a metastatic non-small cell carcinoma. Note: Immunostains performed shows that the tumor cells are positive Tier I: Variants of Strong Clinical Significance  EGFR p.([id number]) Tier II: Variants of Potential Clinical Significance  TP53 p.([id number]) Tier III: Variants of Unknown Clinical Significance CDKN2A p.([id number]) PD-L1 1-2% 4/29/24 MRIB - negative for metastatic disease  6/26/24: CT Chest: 1. Nonocclusive segmental branch pulmonary artery embolism involving the lateral basal segment to the left lower lobe. No evidence of pulmonary hemorrhage or infarction. No pulmonary artery dilatation or right ventricular strain. 2. Loculated right upper lobe neoplasm which may be minimally smaller in size as described above. 3. Stable confluent mediastinal lymphadenopathy with near complete occlusion of the brachiocephalic confluence and proximal IVC. The results of this examination were verbally communicated with read back to the physician, LILLIAN GREEN, on on 6/26/2024 at 12:08 PM.   8/20/24: Ct Chest:  1. Slight interval decrease in the size of a spiculated nodular neoplasm within the apical segment of the right upper lobe. 2. 2 new linear opacities within the apical segment of the right upper lobe as above. Short interval surveillance is suggested. 3. The remainder the study is unchanged with bulky mediastinal and perihilar lymphadenopathy with stable near complete slitlike occlusion of the superior vena cava and mild progressive narrowing of the right main and right upper lobe bronchus.   Ms. Darby recently presented to the Emergency Department (ED) with a 3-4 day history of dyspnea and dry cough. She was found to be hypoxic in the ED, oxygen sat of 90% on room air. CT scan revealed new bilateral symmetric consolidative and ground-glass airspace disease, predominantly in the mid and upper lung fields, raising concerns for radiation pneumonitis. She was started on high dose steroids and HFNC. A transbronchial biopsy was performed, which indicated organizing pneumonia.   She was gradually weaned down to room air, requiring 2 liters of oxygen on exertion. She was discharged on a tapering dose of steroids with Pneumocystis jirovecii pneumonia (PJP) prophylaxis.   11/1/24: CT Chest:  1. Interval resolution of perihilar crazy paving opacities with newly developing perihilar consolidation which encases the bronchovascular bundles with newly developing traction bronchiectasis. This may represent the sequela of radiation fibrosis. Possibility of lymphangitic carcinomatosis cannot be excluded. 2. Mild interval improvement in diffuse infiltration of the mediastinal fat planes which may represent a mildly improving lymphadenopathy. 3. Limited evaluation of the vessels and inferior vena cava the absence of intravenous contrast.  [de-identified] : CTSx: Dr.Lee Yorkc: Dr.Wernicke [de-identified] : Adenocarcinoma - PD-L1 1-2%, EGFR L858R [FreeTextEntry1] : 5/23/24: C1 cisplatin/pemetrexed RT start 6/3/2024 6/13/24   C2 cisplatin/pemetrexed  7/10/24: C3 cisplatin/pemetrexed completed  7/18/24: Last day of RT 8/24/24: Tagrisso 80 mg daily 9/14/24: Tagrisso HELD for pneumonitis 11/15/24: restarted Tagrisso 40mg (reduced dose) [de-identified] : The patient presents for follow-up, with her daughter Tatyana on call who helped interpreting [facility]. Tagrisso was held from 9/14-11/14 for pneumonitis. She had an interval CT scan on 11/1/24, which showed radiation fibrosis changes. She is following Dr. Nieto and is scheduled to see him tomorrow.  Tagrisso was restarted at 40mg daily, since 11/15/24. She has tolerated it well. Complains of her baseline XIONG, but can walk for 30 minutes without interruption. Occasional nonproductive cough, a few times per hour, which is greatly improved from prior. No f/s/c, n/v/d, decreased appetite, or rash. Notes weight gain since starting prednisone.

## 2024-12-18 NOTE — PHYSICAL EXAM
PT DAILY TREATMENT NOTE - [facility] 2-15    Patient Name: Cristina Justice  Date:2018  : 1950  [x]  Patient  Verified  Payor: [place] / Plan: [place] B / Product Type: Medicare /    In time: 10:00  Out time:10:55 AM  Total Treatment Time (min): 65  Total Timed Codes (min):55  1:1 Treatment Time ([address] only): 25  Visit #: 11    Treatment Area: Strain of muscle, fascia and tendon of lower back, subsequent encounter [S39.671D]    SUBJECTIVE  Pain Level (0-10 scale): 0/10  Any medication changes, allergies to medications, adverse drug reactions, diagnosis change, or new procedure performed?: [x] No    [] Yes (see summary sheet for update)  Subjective functional status/changes:   [] No changes reported  Patient reports some tightness this morning, but no pain.     OBJECTIVE    Modality rationale: decrease pain and increase tissue extensibility to improve the patients ability to the patients ability to return to N ADL skills   Min Type Additional Details    [] Estim: []Att   []Unatt        []TENS instruct                  []IFC  []Premod   []NMES                     []Other:  []w/US   []w/ice   []w/heat  Position:   Location:     []  Traction: [] Cervical       []Lumbar                       [] Prone          []Supine                       []Intermittent   []Continuous Lbs:  [] before manual  [] after manual  []w/heat    []  Ultrasound: []Continuous   [] Pulsed at:                           []1MHz   []3MHz Location:  W/cm2:    [] Paraffin         Location:   []w/heat   10 []  Ice     [x]  Heat  []  Ice massage Position: supine  Location: low back    []  Laser  []  Other: Position:  Location:      []  Vasopneumatic Device Pressure:       [] lo [] med [] hi   Temperature:      [x] Skin assessment post-treatment:  [x]intact [x]redness- no adverse reaction    []redness - adverse reaction:     55 min Therapeutic exercise:  [x]  See flow sheet :   Rationale: increase ROM and increase strength  to improve the patients ability to get up from a chair without pain            With   [] TE   [] TA   [] neuro   [] other: Patient Education: [x] Review HEP    [] Progressed/Changed HEP based on:   [] positioning   [] body mechanics   [] transfers   [] heat/ice application    [] other:      Other Objective/Functional Measures:     Pain Level (0-10 scale) post treatment: 0    ASSESSMENT/Changes in Function:     Patient will continue to benefit from skilled PT services to modify and progress therapeutic interventions, address functional mobility deficits, address ROM deficits, address strength deficits, analyze and address soft tissue restrictions, analyze and cue movement patterns, analyze and modify body mechanics/ergonomics and assess and modify postural abnormalities to attain remaining goals. []  See Plan of Care  []  See progress note/recertification  []  See Discharge Summary    Progress towards goals:  Patient continues to do very well with PT and tolerated today's progression of resistance used with therapeutic exercises very well. PLAN  [x]  Upgrade activities as tolerated     [x]  Continue plan of care  []  Update interventions per flow sheet       []  Discharge due to:_  [x]  Other:    Be Catherine, PT  , DPT, OCS, Cert.  DN   2/12/2018  1:48 PM [Normal] : affect appropriate [de-identified] : Tachycardic. Regular rhythm. No MRG. [de-identified] : EOMI, no conjunctival injection, anicteric [de-identified] : + moon facies [de-identified] : No calf tenderness or swelling.

## 2024-12-18 NOTE — HISTORY OF PRESENT ILLNESS
[Disease: _____________________] : Disease: [unfilled] [T: ___] : T[unfilled] [N: ___] : N[unfilled] [M: ___] : M[unfilled] [AJCC Stage: ____] : AJCC Stage: [unfilled] [90: Able to carry normal activity; minor signs or symptoms of disease.] : 90: Able to carry normal activity; minor signs or symptoms of disease.  [ECOG Performance Status: 1 - Restricted in physically strenuous activity but ambulatory and able to carry out work of a light or sedentary nature] : Performance Status: 1 - Restricted in physically strenuous activity but ambulatory and able to carry out work of a light or sedentary nature, e.g., light house work, office work [de-identified] : America Darby is a 58-year-old female who presents to the clinic for follow up of stage IIIB NSCLC - adenocarcinoma histology (EGFR L858R).  Onx Hx: History obtained via translation from daughters Patient noted to have a cough in September 2023, initially thought to be URI, nonresponsive to steroids, zeepack/antibiotics. Further workup (see below) showed RUL nodule with mediastinal and right hilar lymphadenopathy, and bronchoscopy performed in April confirmed diagnosis of lung adenocarcinoma.  FH Noncontributory  SH nonsmoker, second hand smoking from . No alcohol, other drugs, or other supplements [facility] - stopped working since daughters prefer her not to travel Lives in [place] Fully functional at home  2/29/24: CTX: Opacity in the medial right upper lobe, right paratracheal region.  3/22/24: CT: Medial subpleural right upper lobe mildy spiculated nodule. Mediastinal and right hilar lymphadenopathy as described, most notably a necrotic right paratracheal jean pierre conglomerate. These findings are suspicious for primary lung malignancy. Question of direct intravascular invasion involving the superior vena cava and left branchiocephalic vein.  4/6/24: PET: Right upper lobe lung mass shows significant metabolic activity and likely malignant. Bilateral supraclavicular, mediastinal and bilateral hilar adenopathy showing moderate-significant activity. Nodularity and thickening along the right major fissure in the upper lobe but no metabolic activity. Left adnexal cyst. In a postmenopausal patient, this can be followed by transvaginal ultrasound urgent basis. Bilateral cystic structures following there is sacral foramen which are probably perineural cysts amd can be further evaluated with MRI of the sacrum.  4/12/24 BAL RUL - non-small cell carcinoma, adenocarcinoma of lung origin  Results of FB EBUS biopsy performed on 04/12/2024 of the RUL bronchoalevolar lavage was positive for malignant cells and was a non-small cell carcinoma. The tracheal biopsy was an adenocarcinoma of lung origin. Lymph node 4R was positive for malignant cells and is a metastatic non-small cell carcinoma. Note: Immunostains performed shows that the tumor cells are positive Tier I: Variants of Strong Clinical Significance  EGFR p.([id number]) Tier II: Variants of Potential Clinical Significance  TP53 p.([id number]) Tier III: Variants of Unknown Clinical Significance CDKN2A p.([id number]) PD-L1 1-2% 4/29/24 MRIB - negative for metastatic disease  6/26/24: CT Chest: 1. Nonocclusive segmental branch pulmonary artery embolism involving the lateral basal segment to the left lower lobe. No evidence of pulmonary hemorrhage or infarction. No pulmonary artery dilatation or right ventricular strain. 2. Loculated right upper lobe neoplasm which may be minimally smaller in size as described above. 3. Stable confluent mediastinal lymphadenopathy with near complete occlusion of the brachiocephalic confluence and proximal IVC. The results of this examination were verbally communicated with read back to the physician, LILLIAN GREEN, on on 6/26/2024 at 12:08 PM.   8/20/24: Ct Chest:  1. Slight interval decrease in the size of a spiculated nodular neoplasm within the apical segment of the right upper lobe. 2. 2 new linear opacities within the apical segment of the right upper lobe as above. Short interval surveillance is suggested. 3. The remainder the study is unchanged with bulky mediastinal and perihilar lymphadenopathy with stable near complete slitlike occlusion of the superior vena cava and mild progressive narrowing of the right main and right upper lobe bronchus.   Ms. Darby recently presented to the Emergency Department (ED) with a 3-4 day history of dyspnea and dry cough. She was found to be hypoxic in the ED, oxygen sat of 90% on room air. CT scan revealed new bilateral symmetric consolidative and ground-glass airspace disease, predominantly in the mid and upper lung fields, raising concerns for radiation pneumonitis. She was started on high dose steroids and HFNC. A transbronchial biopsy was performed, which indicated organizing pneumonia.   She was gradually weaned down to room air, requiring 2 liters of oxygen on exertion. She was discharged on a tapering dose of steroids with Pneumocystis jirovecii pneumonia (PJP) prophylaxis.   11/1/24: CT Chest:  1. Interval resolution of perihilar crazy paving opacities with newly developing perihilar consolidation which encases the bronchovascular bundles with newly developing traction bronchiectasis. This may represent the sequela of radiation fibrosis. Possibility of lymphangitic carcinomatosis cannot be excluded. 2. Mild interval improvement in diffuse infiltration of the mediastinal fat planes which may represent a mildly improving lymphadenopathy. 3. Limited evaluation of the vessels and inferior vena cava the absence of intravenous contrast.  [de-identified] : Adenocarcinoma - PD-L1 1-2%, EGFR L858R [de-identified] : CTSx: Dr.Lee Yorkc: Dr.Wernicke [FreeTextEntry1] : 5/23/24: C1 cisplatin/pemetrexed RT start 6/3/2024 6/13/24   C2 cisplatin/pemetrexed  7/10/24: C3 cisplatin/pemetrexed completed  7/18/24: Last day of RT 8/24/24: Tagrisso 80 mg daily 9/14/24: Tagrisso HELD for pneumonitis 11/15/24: restarted Tagrisso 40mg (reduced dose) [de-identified] : The patient presents for follow-up, with her daughter Tatyana on call [facility]. Tagrisso was held from 9/14-11/14 for pneumonitis. She had an interval CT scan on 11/1/24, which showed radiation fibrosis changes. She is following Dr. Nieto and is scheduled to see him tomorrow.  Tagrisso was restarted at 40mg daily, since 11/15/24. She has tolerated it well. Complains of her baseline XIONG, but can walk for 30 minutes without interruption. Occasional nonproductive cough, a few times per hour, which is greatly improved from prior. No f/s/c, n/v/d, decreased appetite, or rash. Notes weight gain since starting prednisone.

## 2024-12-18 NOTE — ASSESSMENT
[FreeTextEntry1] : America Darby is a 58-year-old female with no significant prior PMH who presents for follow up of for lung adenocarcinoma, [id number] - Stage IIIB, EGFR L858R.   Stage IIIB Lung Adenocarcinoma with supraclavicular, mediastinal and bilateral hilar adenopathy - EGFR exon 21 L858R mutation positive on NGS - PET/CT on 04/06/2024 - RUL lung mass shows significant metabolic activity, B/L supraclavicular, mediastinal and b/l hilar adenopathy showing moderate-significant metabolic activity; Nodularity and thickening along the right major fissure in the upper lobe but no metabolic activity; Left adnexal cyst.;B/L cystic structures following there is sacral foramen which are probably perineural cysts and can be further evaluated with MRI of the sacrum. - 4/12/2024 - FB EBUS biopsy on 04/12/2024 - tandard of care treatment for stage IIIB lung cancer is concurrent chemoradiation (prefer cisplatin/pemetrexed for adenocarcinoma histology), followed by immunotherapy, though given EGFR mutation, we will substitute tagrisso for maintenance therapy after concurrent chemoradaition despite PACIFIC trial results.  Recent approval based on LEE trial - Tagrisso post CCRT with significant prolongation of PFS.  Completed cisplatin/pemetrexed every 3 weeks x  3 cycles with concurrent RT, was on osimertinib maintenance therapy with plan to continue indefinitely per LEE trail results (June 2024) however, the patient developed pneumonitis and treatment was held from 9/14/24-11/14/24. Now rechallenging at Tagrisso 40mg daily, tolerating well.  --Tagrisso was held (9/14/24-11/14/24) for pneumonitis, now resolving --CT chest 11/1/24 showed stable disease --rechallenging Tagrisso 40mg daily (started 11/15/24), tolerating well --Labs notable for grade 1 ALT elevation, possibly 2/2 Tagrisso. Bilirubin wnl. --will hold off on Tagrisso escalation at this time; continue current dose --monitor EKG q3-6 months (to be done at next visit)  Pulmonary embolus likely hypercoagulability in setting of malignancy --continue Eliquis 5mg BID  Anemia [RESOLVED]  --Will continue to monitor   RTC in 2 months.  Please see attending physician attestation below.

## 2024-12-18 NOTE — ASSESSMENT
[FreeTextEntry1] : America Darby is a 58-year-old female with no significant prior PMH who presents for follow up of for lung adenocarcinoma, [id number] - Stage IIIB, EGFR L858R.   Stage IIIB Lung Adenocarcinoma with supraclavicular, mediastinal and bilateral hilar adenopathy - EGFR exon 21 L858R mutation positive on NGS - PET/CT on 04/06/2024 - RUL lung mass shows significant metabolic activity, B/L supraclavicular, mediastinal and b/l hilar adenopathy showing moderate-significant metabolic activity; Nodularity and thickening along the right major fissure in the upper lobe but no metabolic activity; Left adnexal cyst.;B/L cystic structures following there is sacral foramen which are probably perineural cysts and can be further evaluated with MRI of the sacrum. - 4/12/2024 - FB EBUS biopsy on 04/12/2024 - tandard of care treatment for stage IIIB lung cancer is concurrent chemoradiation (prefer cisplatin/pemetrexed for adenocarcinoma histology), followed by immunotherapy, though given EGFR mutation, we will substitute tagrisso for maintenance therapy after concurrent chemoradaition despite PACIFIC trial results.  Recent approval based on LEE trial - Tagrisso post CCRT with significant prolongation of PFS.  Completed cisplatin/pemetrexed every 3 weeks x  3 cycles with concurrent RT, was on osimertinib maintenance therapy with plan to continue indefinitely per LEE trail results (June 2024) however, the patient developed pneumonitis and treatment was held from 9/14/24-11/14/24. Now rechallenging at Tagrisso 40mg daily, tolerating well.  --Tagrisso was held (9/14/24-11/14/24) for pneumonitis, now resolving --CT chest 11/1/24 showed stable disease --rechallenging Tagrisso 40mg daily (started 11/15/24), tolerating well --Labs notable for grade 1 ALT elevation, possibly 2/2 Tagrisso. Bilirubin wnl. --will hold off on Tagrisso escalation at this time; continue current dose --monitor EKG q3-6 months (to be done at next visit)  Pulmonary embolus likely hypercoagulability in setting of malignancy --continue Eliquis 5mg BID  Anemia [RESOLVED]  --Will continue to monitor   RTC in 2 months.  Please see attending physician attestation below.

## 2025-01-16 NOTE — REVIEW OF SYSTEMS
[GERD] : gerd [Negative] : Endocrine [TextBox_3] : Fatigue [TextBox_30] : Dry cough; exertional dyspnea

## 2025-01-16 NOTE — ADDENDUM
[FreeTextEntry1] : Addendum (Emilia; 1/16/25): I tried calling Ms. Darby's daughter (Tatyana) to review her chest CT and our tumor board discussion from this morning. There was no answer, but I left a message with a callback number. In review, Ms. Darby has a history of stage IIIB NSCLC with EGFR mutation and severe pneumonitis in the setting of chemoradiation and adjuvant Osimertinib. Due to recurrence of symptoms in the setting of Osimertinib re-initiation, we obtained an interim CT.  Chest CT IMPRESSION:  1. Since November 1, 2024, new left lower lobe opacity and right greater than left small pleural effusions on background of unchanged perihilar fibrosis, probably inflammatory etiology such as organizing pneumonia.  2. No recurrence.  There is recurrence of a moderate R pleural effusion and worsened ground glass involving the LLL and SILVIA. Though the pleural effusion is atypical for pneumonitis, with prior pneumonitis treatment, her effusion resolved. 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 We will hold Osimertinib, re-start Prednisone 50 mg PO daily (1 mg/kg), Bactrim prophylaxis (1 single strength tablet daily), and follow-up with me next week to re-evaluate her pleural effusion for drainage.

## 2025-01-16 NOTE — ASSESSMENT
[FreeTextEntry1] : Labs: 10/2024: WBC 12.6, Hgb 9.9, Plts 273 Na 133 K 4.1, Cl 102, HCO3 28, BUN/creat 18/1.00, glucose 167, Ca 9.1 Tbili 0.6, AST/ALT 63/59, Alk phos 66, TP/Albumin 6.4/2.8  Bronchoscopy (4/2024): BAL: POSITIVE FOR MALIGNANT CELLS. Non- small cell carcinoma.  Final Diagnosis Trachea, biopsy and touch preparation: -Adenocarcinoma of lung origin. Note: Immunostains performed shows that the tumor cells are positive for TTF-1 and negative for p40. This  staining profile supports the diagnosis.  Final Diagnosis LYMPH NODE, 4R, EBUS-GUIDED FNA POSITIVE FOR MALIGNANT CELLS. Metastatic non-small cell carcinoma. Cell block shows similar findings. Positive EGFR mutation   Bronchoscopy (9/2024): WBC 38 (L15, P12, M8, Eos 3)  BRONCHOALVEOLAR LAVAGE, RIGHT, LOWER LOBE Final Diagnosis LUNG, RIGHT LOWER LOBE, BAL: NEGATIVE FOR MALIGNANT CELLS Benign bronchial cells, alveolar macrophages, inflammatory cells. The cellblock shows similar findings.  Transbronchial biopsy: Final Diagnosis 1. Lung , right lower lobe, biopsy: - A minute fragment of lung tissue with reactive changes and focal areas suggestive of an organizing pneumonia.  Bacterial culture: negative AFB culture: NGTD Fungus culture: NGTD  BAL PJP PCR negative.  PET/CT (4/2024): Right upper lobe lung mass shows significant metabolic activity and likely malignant. Bilateral supraclavicular, mediastinal and bilateral hilar adenopathy showing moderate-significant activity. Nodularity and thickening along the right major fissure in the upper lobe but no metabolic activity. Left adnexal cyst. In a postmenopausal patient, this can be followed by transvaginal ultrasound urgent basis. Bilateral cystic structures following there is sacral foramen which are probably perineural cysts and can be further evaluated with MRI of the sacrum.  Chest CT (6/26/24) 1. Nonocclusive segmental branch pulmonary artery embolism involving the lateral basal segment to the left lower lobe. No evidence of pulmonary hemorrhage or infarction. No pulmonary artery dilatation or right ventricular strain. Loculated right upper lobe neoplasm which may be minimally smaller in size. Stable confluent mediastinal lymphadenopathy with near complete occlusion of the brachiocephalic confluence and proximal IVC  Ct Chest (8/2024): 1. Slight interval decrease in the size of a spiculated nodular neoplasm within the apical segment of the right upper lobe. 2. 2 new linear opacities within the apical segment of the right upper lobe as above. Short interval surveillance is suggested. 3. The remainder the study is unchanged with bulky mediastinal and perihilar lymphadenopathy with stable near complete slitlike occlusion of the superior vena cava and mild progressive narrowing of the right main and right upper lobe bronchus.  CT PE (9/2024): IMPRESSION: 1. No pulmonary embolism. 2. Since August 20, 2024, new bilateral symmetric consolidative and groundglass airspace disease with mid and upper lung field predominance. Differential for this pattern includes noncardiogenic pulmonary edema (including ARDS), hypersensitivity pneumonitis, cardiogenic pulmonary edema/CHF, pulmonary alveolar proteinosis, diffuse alveolar hemorrhage, or atypical pneumonia (including Pneumocystis jirovecii). 3. Mildly increased right pleural effusion.  Chest CT (11/2024): IMPRESSION: 1. Interval resolution of perihilar crazy paving opacities with newly developing perihilar consolidation which encases the bronchovascular bundles with newly developing traction bronchiectasis. This may represent the sequela of radiation fibrosis. Possibility of lymphangitic carcinomatosis cannot be excluded. 2. Mild interval improvement in diffuse infiltration of the mediastinal fat planes which may represent a mildly improving lymphadenopathy. 3. Limited evaluation of the vessels and inferior vena cava the absence of intravenous contrast.  CXR (12/2024) IMPRESSION: Lung infiltrates considerably improved/nearly resolved on left side and partially improved on right side compared to prior exam 9/17/2024. No acute infiltrates appearing. No significant pleural effusion. No pneumothorax. Unremarkable cardiac silhouette. No acute bone abnormality.   PFTs             12/18/24 1/2025 FEV1/FVC     70%               72% FEV1             1.39, 62%      1.28, 57% FVC               2.00, 69%      1.78, 62% TLC               -                      2.19, 51% RV                 -                      0.41, 24% DLCO            7.78. 38%       7.45, 36% DLadj            42% -12/2024: Positive bronchodilator response (FEV1 improved 160mL, 13%; FEV1/FVC normalizes)  6MWT: 12/2024: 402 meters (1319 feet), fartun SpO2 94% on room air 1/2025: 384 meters (1260 feet), fartun SpO2 96% on room air  A/P: 59 yo F h/o stage IIIB NSCLC (RUL mass, tracheal involvement N2 and N3 lymph nodes) s/p concurrent chemoradiation and Osimertinib), recent PE, and Afib returns to clinic after a recent hospitalization for cancer treatment-related pneumonitis.  Ms. Darby has worsened cough and exertional dyspnea. She is not having infectious symptoms. In the setting of recent TKI re-initiation and a normal heart evaluation, I am concerned for recurrent pneumonitis. Her PFTs this week continue to show restriction with severely reduced DLCO. There is no hypoxemia. I would like to obtain her chest CT earlier than planned; if recurrent, we may need to re-initiate steroids and hold her TKI.  For the PE, she remains on Apixaban. Given her malignancy, I would favor an extended course.  1. Cancer treatment-related pneumonitis: radiation vs chemotherapy vs Osimertinib 2. NSCLC (stage IIIB) with EGFR mutation 3. Fatigue 4. h/o PE (6/2024) 5. Moderate COPD  -CT in 1-2 weeks -continue Symbicort -Famotidine 20 mg PO daily -continue Osimertinib at 40 mg -Calcium and Vitamin D supplementation -continue Apixaban 5 mg PO BID -recommended regular physical activity  -follow-up with me in 1-2 weeks after the CT

## 2025-01-16 NOTE — HISTORY OF PRESENT ILLNESS
[Never] : never [TextBox_4] : I spoke with Ms. America Darby's daughter today in follow-up for lung cancer with treatment-related pneumonitis. In review, Ms. Darby is a 57 yo F h/o NSCLC (adenocarcinoma, stage IIIB (RUL, tracheal involvement, mediastinal and supraclavicular lymph nodes; EGFR mutation) s/p concurrent chemoradiation with adjuvant Osimertinib who was hospitalized in 9/2024 for cough, progressive exertional dyspnea, and acute hypoxemic respiratory failure. She underwent bronchoscopy that ruled out alveolar hemorrhage; organizing pneumonia on biopsy and the clinical presentation favored drug related pneumonitis. With 2 mg/kg Methylprednisolone, she improved. A small R pleural effusion was noted during hospitalization, though not amenable to thoracentesis.  Cancer treatment course 5/23/2024: C1 cisplatin/pemetrexed RT start 6/3/2024 6/13/24 C2 cisplatin/pemetrexed 7/10/2024: C3 cisplatin/pemetrexed completed 7/18/2024: Last day of RT 8/24/2024: Osimertinib 80 mg Qd-.  10/2024: Since discharge, Ms. Darby has felt steadily better. She is no longer using supplemental oxygen. She is able to walk at a normal pace for 30 minutes before having to rest. Her appetite and weight are stable. She is not having cough. With Prednisone, she is having some epigastric burning.  10/2024: Ms. Darby has continued to feel better on her steroid taper, currently at Prednisone 25 mg PO daily. She has remained active via walking 30 minutes/day. She has some dyspnea when walking up hills. Her appetite and weight are stable. She has noticed some blurriness in her vision, which she associates with cataracts. She has an Ophthalmology visit next week.  12/18/24 She is doing well she restarted the Osimertinib about a month ago and stopped the prednisone about a week ago. She feels well about the same without any worsening of her symptoms. She walks everyday for about 30 minutes but gets short of breath after about 3 flights of stairs. Denies any recent fevers, chills, infections. Still has a dry cough that comes and goes without any predilection for time of day.   1/2025: I spoke to Ms. Darby's daughter today. Since our last visit, she has had worsened cough and exertional dyspnea. She is not having fever, chills, or sputum production. She is taking the Symbicort inconsistently. She was evaluated by her cardiologist and had an echocardiogram recently, which were reportedly normal.  PMH: NSCLC (adenocarcinoma, stage IIIB (RUL, tracheal involvement, mediastinal and supraclavicular lymph nodes; EGFR mutation) PE (LLL; 7/2024) Afib s/p ablation  SH: Never smoker. History of second-hand smoke exposure.

## 2025-01-22 NOTE — HISTORY OF PRESENT ILLNESS
[Never] : never [TextBox_4] : Ms. America Darby returned to clinic today in follow-up for lung cancer with treatment-related pneumonitis; she was accompanied by her dauaghter and son-in-law. In review, Ms. Darby is a 59 yo F h/o NSCLC (adenocarcinoma, stage IIIB (RUL, tracheal involvement, mediastinal and supraclavicular lymph nodes; EGFR mutation) s/p concurrent chemoradiation with adjuvant Osimertinib who was hospitalized in 9/2024 for cough, progressive exertional dyspnea, and acute hypoxemic respiratory failure. She underwent bronchoscopy that ruled out alveolar hemorrhage; organizing pneumonia on biopsy and the clinical presentation favored drug related pneumonitis. With 2 mg/kg Methylprednisolone, she improved. A small R pleural effusion was noted during hospitalization, though not amenable to thoracentesis.  Cancer treatment course 5/23/2024: C1 cisplatin/pemetrexed RT start 6/3/2024 6/13/24 C2 cisplatin/pemetrexed 7/10/2024: C3 cisplatin/pemetrexed completed 7/18/2024: Last day of RT 8/24/2024: Osimertinib 80 mg Qd-. 12/2024: Osimertinib re-trial: 40 mg PO daily   10/2024: Since discharge, Ms. Darby has felt steadily better. She is no longer using supplemental oxygen. She is able to walk at a normal pace for 30 minutes before having to rest. Her appetite and weight are stable. She is not having cough. With Prednisone, she is having some epigastric burning.  10/2024: Ms. Darby has continued to feel better on her steroid taper, currently at Prednisone 25 mg PO daily. She has remained active via walking 30 minutes/day. She has some dyspnea when walking up hills. Her appetite and weight are stable. She has noticed some blurriness in her vision, which she associates with cataracts. She has an Ophthalmology visit next week.  12/18/24 She is doing well she restarted the Osimertinib about a month ago and stopped the prednisone about a week ago. She feels well about the same without any worsening of her symptoms. She walks everyday for about 30 minutes but gets short of breath after about 3 flights of stairs. Denies any recent fevers, chills, infections. Still has a dry cough that comes and goes without any predilection for time of day.   1/2025: I spoke to Ms. Darby's daughter today. Since our last visit, she has had worsened cough and exertional dyspnea. She is not having fever, chills, or sputum production. She is taking the Symbicort inconsistently. She was evaluated by her cardiologist and had an echocardiogram recently, which were reportedly normal.  1/21/25: 1/2025: Ms. Darby reports feeling "ok" today. Her daughter notes continued daily cough that is persistent. She has dyspnea when climbing stairs and walking long distances. Her appetite and weight are stable. No fever or chills.  PMH: NSCLC (adenocarcinoma, stage IIIB (RUL, tracheal involvement, mediastinal and supraclavicular lymph nodes; EGFR mutation) PE (LLL; 7/2024) Afib s/p ablation  SH: Never smoker. History of second-hand smoke exposure.

## 2025-01-22 NOTE — PHYSICAL EXAM
[No Acute Distress] : no acute distress [Normal Appearance] : normal appearance [Normal Rate/Rhythm] : normal rate/rhythm [Normal S1, S2] : normal s1, s2 [No Murmurs] : no murmurs [No Resp Distress] : no resp distress [Clear to Auscultation Bilaterally] : clear to auscultation bilaterally [No Abnormalities] : no abnormalities [Benign] : benign [Normal Gait] : normal gait [No Clubbing] : no clubbing [No Cyanosis] : no cyanosis [No Edema] : no edema [FROM] : FROM [Normal Color/ Pigmentation] : normal color/ pigmentation [No Focal Deficits] : no focal deficits [Oriented x3] : oriented x3 [Normal Affect] : normal affect [TextBox_11] : NC/AT [TextBox_68] : Reduced breath sounds in R lower lung field; no adventitious sounds

## 2025-01-22 NOTE — PROCEDURE
[FreeTextEntry1] : Bedside US (1/21/25): Right lung with mild/moderate anechoic pleural effusion Left lung with no pleural effusion.

## 2025-01-22 NOTE — ASSESSMENT
[FreeTextEntry1] : Labs: 10/2024: WBC 12.6, Hgb 9.9, Plts 273 Na 133 K 4.1, Cl 102, HCO3 28, BUN/creat 18/1.00, glucose 167, Ca 9.1 Tbili 0.6, AST/ALT 63/59, Alk phos 66, TP/Albumin 6.4/2.8  Bronchoscopy (4/2024): BAL: POSITIVE FOR MALIGNANT CELLS. Non- small cell carcinoma.  Final Diagnosis Trachea, biopsy and touch preparation: -Adenocarcinoma of lung origin. Note: Immunostains performed shows that the tumor cells are positive for TTF-1 and negative for p40. This  staining profile supports the diagnosis.  Final Diagnosis LYMPH NODE, 4R, EBUS-GUIDED FNA POSITIVE FOR MALIGNANT CELLS. Metastatic non-small cell carcinoma. Cell block shows similar findings. Positive EGFR mutation   Bronchoscopy (9/2024): WBC 38 (L15, P12, M8, Eos 3)  BRONCHOALVEOLAR LAVAGE, RIGHT, LOWER LOBE Final Diagnosis LUNG, RIGHT LOWER LOBE, BAL: NEGATIVE FOR MALIGNANT CELLS Benign bronchial cells, alveolar macrophages, inflammatory cells. The cellblock shows similar findings.  Transbronchial biopsy: Final Diagnosis 1. Lung , right lower lobe, biopsy: - A minute fragment of lung tissue with reactive changes and focal areas suggestive of an organizing pneumonia.  Bacterial culture: negative AFB culture: NGTD Fungus culture: NGTD  BAL PJP PCR negative.  PET/CT (4/2024): Right upper lobe lung mass shows significant metabolic activity and likely malignant. Bilateral supraclavicular, mediastinal and bilateral hilar adenopathy showing moderate-significant activity. Nodularity and thickening along the right major fissure in the upper lobe but no metabolic activity. Left adnexal cyst. In a postmenopausal patient, this can be followed by transvaginal ultrasound urgent basis. Bilateral cystic structures following there is sacral foramen which are probably perineural cysts and can be further evaluated with MRI of the sacrum.  Chest CT (6/26/24) 1. Nonocclusive segmental branch pulmonary artery embolism involving the lateral basal segment to the left lower lobe. No evidence of pulmonary hemorrhage or infarction. No pulmonary artery dilatation or right ventricular strain. Loculated right upper lobe neoplasm which may be minimally smaller in size. Stable confluent mediastinal lymphadenopathy with near complete occlusion of the brachiocephalic confluence and proximal IVC  Ct Chest (8/2024): 1. Slight interval decrease in the size of a spiculated nodular neoplasm within the apical segment of the right upper lobe. 2. 2 new linear opacities within the apical segment of the right upper lobe as above. Short interval surveillance is suggested. 3. The remainder the study is unchanged with bulky mediastinal and perihilar lymphadenopathy with stable near complete slitlike occlusion of the superior vena cava and mild progressive narrowing of the right main and right upper lobe bronchus.  CT PE (9/2024): IMPRESSION: 1. No pulmonary embolism. 2. Since August 20, 2024, new bilateral symmetric consolidative and groundglass airspace disease with mid and upper lung field predominance. Differential for this pattern includes noncardiogenic pulmonary edema (including ARDS), hypersensitivity pneumonitis, cardiogenic pulmonary edema/CHF, pulmonary alveolar proteinosis, diffuse alveolar hemorrhage, or atypical pneumonia (including Pneumocystis jirovecii). 3. Mildly increased right pleural effusion.  Chest CT (11/2024): IMPRESSION: 1. Interval resolution of perihilar crazy paving opacities with newly developing perihilar consolidation which encases the bronchovascular bundles with newly developing traction bronchiectasis. This may represent the sequela of radiation fibrosis. Possibility of lymphangitic carcinomatosis cannot be excluded. 2. Mild interval improvement in diffuse infiltration of the mediastinal fat planes which may represent a mildly improving lymphadenopathy. 3. Limited evaluation of the vessels and inferior vena cava the absence of intravenous contrast.  CXR (12/2024) IMPRESSION: Lung infiltrates considerably improved/nearly resolved on left side and partially improved on right side compared to prior exam 9/17/2024. No acute infiltrates appearing. No significant pleural effusion. No pneumothorax. Unremarkable cardiac silhouette. No acute bone abnormality.  Chest CT (1/2025): IMPRESSION:  1. Since November 1, 2024, new left lower lobe opacity and right greater than left small pleural effusions on background of unchanged perihilar fibrosis, probably inflammatory etiology such as organizing pneumonia.  2. No recurrence.   PFTs             12/18/24 1/2025 FEV1/FVC     70%               72% FEV1             1.39, 62%      1.28, 57% FVC               2.00, 69%      1.78, 62% TLC               -                      2.19, 51% RV                 -                      0.41, 24% DLCO            7.78. 38%       7.45, 36% DLadj            42% -12/2024: Positive bronchodilator response (FEV1 improved 160mL, 13%; FEV1/FVC normalizes)  6MWT: 12/2024: 402 meters (1319 feet), fartun SpO2 94% on room air 1/2025: 384 meters (1260 feet), fartun SpO2 96% on room air  A/P: 57 yo F h/o stage IIIB NSCLC (RUL mass, tracheal involvement N2 and N3 lymph nodes) s/p concurrent chemoradiation and Osimertinib), recent PE, and Afib returns to clinic after a recent hospitalization for cancer treatment-related pneumonitis.  Ms. Darby's recent chest CT has a new consolidation in the LLL, R pleural effusion, and patchy ground glass in the SILVIA/LLL in combination with a worsening cough and exertional dyspnea. Since developing with re-initiation of Osimertinib, her presentation is most suspicious for Osimertinib pneumonitis. Recurrent pneumonitis (i.e., OP) in the setting of steroid weaning is possible, though seems less likely. We discussed her case in tumor board last week, with Osimertinib related pneumonitis thought most likely.  We discussed re-starting Prednisone 1 mg/kg (50 mg/day) x2 weeks and holding Osimertnib. When she has symptom resolution, we can rediscuss tapering slowly. I would again anticipate at least 3 months of therapy. We re-started prophylactic Bactrim SS daily; she re-started Famotidine to address GERD symptoms.  Her pleural effusion is amenable to thoracentesis, though she is on Apixaban today. DDx includes pneumonitis related development or development of metastatic disease. Since her prior pleural effusion developed during her pneumonitis presentation and resolved with steroids, I suspect it is again related to pneumonitis (though an atypical presentation for TKI-related pneumonitis).  I would like to see her back in 1 week to re-evaluate and potentially drain the R pleural effusion. She will hold the Apixaban 24 hours prior to our clinic visit.  1. Cancer treatment-related pneumonitis: radiation vs chemotherapy vs Osimertinib 2. NSCLC (stage IIIB) with EGFR mutation 3. Fatigue 4. h/o PE (6/2024) 5. Moderate COPD 6. R pleural effusion  -RVP today -Prednisone 50 mg PO daily x2 weeks; Prophylaxis with Bactrim SS daily -Hold Osimertinib; discuss with Dr. Ahuja -Famotidine 20 mg PO daily for GERD -OK to hold Symbicort if not providing significant relief -Calcium and Vitamin D supplementation -continue Apixaban 5 mg PO BID -Immunizations: not discussed today -follow-up with me next week to re-evaluate pleural effusion; hold Apixaban 24 hours prior

## 2025-01-28 NOTE — HISTORY OF PRESENT ILLNESS
[Never] : never [TextBox_4] : Ms. America Darby returned to clinic today in follow-up for lung cancer with treatment-related pneumonitis; she was accompanied by her daughter and son-in-law. In review, Ms. Darby is a 57 yo F h/o NSCLC (adenocarcinoma, stage IIIB (RUL, tracheal involvement, mediastinal and supraclavicular lymph nodes; EGFR mutation) s/p concurrent chemoradiation with adjuvant Osimertinib who was hospitalized in 9/2024 for cough, progressive exertional dyspnea, and acute hypoxemic respiratory failure. She underwent bronchoscopy that ruled out alveolar hemorrhage; organizing pneumonia on biopsy and the clinical presentation favored drug related pneumonitis. With 2 mg/kg Methylprednisolone, she improved. A small R pleural effusion was noted during hospitalization, though not amenable to thoracentesis.  Cancer treatment course 5/23/2024: C1 cisplatin/pemetrexed RT start 6/3/2024 6/13/24 C2 cisplatin/pemetrexed 7/10/2024: C3 cisplatin/pemetrexed completed 7/18/2024: Last day of RT 8/24/2024: Osimertinib 80 mg Qd-. 12/2024: Osimertinib re-trial: 40 mg PO daily   10/2024: Since discharge, Ms. Darby has felt steadily better. She is no longer using supplemental oxygen. She is able to walk at a normal pace for 30 minutes before having to rest. Her appetite and weight are stable. She is not having cough. With Prednisone, she is having some epigastric burning.  10/2024: Ms. Darby has continued to feel better on her steroid taper, currently at Prednisone 25 mg PO daily. She has remained active via walking 30 minutes/day. She has some dyspnea when walking up hills. Her appetite and weight are stable. She has noticed some blurriness in her vision, which she associates with cataracts. She has an Ophthalmology visit next week.  12/18/24 She is doing well she restarted the Osimertinib about a month ago and stopped the prednisone about a week ago. She feels well about the same without any worsening of her symptoms. She walks everyday for about 30 minutes but gets short of breath after about 3 flights of stairs. Denies any recent fevers, chills, infections. Still has a dry cough that comes and goes without any predilection for time of day.   1/2025: I spoke to Ms. Darby's daughter today. Since our last visit, she has had worsened cough and exertional dyspnea. She is not having fever, chills, or sputum production. She is taking the Symbicort inconsistently. She was evaluated by her cardiologist and had an echocardiogram recently, which were reportedly normal.  1/2025: Ms. Darby reports feeling "ok" today. Her daughter notes continued daily cough that is persistent. She has dyspnea when climbing stairs and walking long distances. Her appetite and weight are stable. No fever or chills.  1/28/25: I saw Ms. Darby in follow-up today. She was accompanied by her daughter, who supplements her history. Ms. Darby remains on Prednisone and has felt steadily better over the last week. She has persistent dry cough, though her exertional dyspnea and fatigue are improving.    PMH: NSCLC (adenocarcinoma, stage IIIB (RUL, tracheal involvement, mediastinal and supraclavicular lymph nodes; EGFR mutation) PE (LLL; 7/2024) Afib s/p ablation  SH: Never smoker. History of second-hand smoke exposure.

## 2025-01-28 NOTE — ASSESSMENT
[FreeTextEntry1] : Labs: 10/2024: WBC 12.6, Hgb 9.9, Plts 273 Na 133 K 4.1, Cl 102, HCO3 28, BUN/creat 18/1.00, glucose 167, Ca 9.1 Tbili 0.6, AST/ALT 63/59, Alk phos 66, TP/Albumin 6.4/2.8  RVP (1/21/25): negative  Bronchoscopy (4/2024): BAL: POSITIVE FOR MALIGNANT CELLS. Non- small cell carcinoma.  Final Diagnosis Trachea, biopsy and touch preparation: -Adenocarcinoma of lung origin. Note: Immunostains performed shows that the tumor cells are positive for TTF-1 and negative for p40. This  staining profile supports the diagnosis.  Final Diagnosis LYMPH NODE, 4R, EBUS-GUIDED FNA POSITIVE FOR MALIGNANT CELLS. Metastatic non-small cell carcinoma. Cell block shows similar findings. Positive EGFR mutation   Bronchoscopy (9/2024): WBC 38 (L15, P12, M8, Eos 3)  BRONCHOALVEOLAR LAVAGE, RIGHT, LOWER LOBE Final Diagnosis LUNG, RIGHT LOWER LOBE, BAL: NEGATIVE FOR MALIGNANT CELLS Benign bronchial cells, alveolar macrophages, inflammatory cells. The cellblock shows similar findings.  Transbronchial biopsy: Final Diagnosis 1. Lung , right lower lobe, biopsy: - A minute fragment of lung tissue with reactive changes and focal areas suggestive of an organizing pneumonia.  Bacterial culture: negative AFB culture: NGTD Fungus culture: NGTD  BAL PJP PCR negative.  PET/CT (4/2024): Right upper lobe lung mass shows significant metabolic activity and likely malignant. Bilateral supraclavicular, mediastinal and bilateral hilar adenopathy showing moderate-significant activity. Nodularity and thickening along the right major fissure in the upper lobe but no metabolic activity. Left adnexal cyst. In a postmenopausal patient, this can be followed by transvaginal ultrasound urgent basis. Bilateral cystic structures following there is sacral foramen which are probably perineural cysts and can be further evaluated with MRI of the sacrum.  Chest CT (6/26/24) 1. Nonocclusive segmental branch pulmonary artery embolism involving the lateral basal segment to the left lower lobe. No evidence of pulmonary hemorrhage or infarction. No pulmonary artery dilatation or right ventricular strain. Loculated right upper lobe neoplasm which may be minimally smaller in size. Stable confluent mediastinal lymphadenopathy with near complete occlusion of the brachiocephalic confluence and proximal IVC  Ct Chest (8/2024): 1. Slight interval decrease in the size of a spiculated nodular neoplasm within the apical segment of the right upper lobe. 2. 2 new linear opacities within the apical segment of the right upper lobe as above. Short interval surveillance is suggested. 3. The remainder the study is unchanged with bulky mediastinal and perihilar lymphadenopathy with stable near complete slitlike occlusion of the superior vena cava and mild progressive narrowing of the right main and right upper lobe bronchus.  CT PE (9/2024): IMPRESSION: 1. No pulmonary embolism. 2. Since August 20, 2024, new bilateral symmetric consolidative and groundglass airspace disease with mid and upper lung field predominance. Differential for this pattern includes noncardiogenic pulmonary edema (including ARDS), hypersensitivity pneumonitis, cardiogenic pulmonary edema/CHF, pulmonary alveolar proteinosis, diffuse alveolar hemorrhage, or atypical pneumonia (including Pneumocystis jirovecii). 3. Mildly increased right pleural effusion.  Chest CT (11/2024): IMPRESSION: 1. Interval resolution of perihilar crazy paving opacities with newly developing perihilar consolidation which encases the bronchovascular bundles with newly developing traction bronchiectasis. This may represent the sequela of radiation fibrosis. Possibility of lymphangitic carcinomatosis cannot be excluded. 2. Mild interval improvement in diffuse infiltration of the mediastinal fat planes which may represent a mildly improving lymphadenopathy. 3. Limited evaluation of the vessels and inferior vena cava the absence of intravenous contrast.  CXR (12/2024) IMPRESSION: Lung infiltrates considerably improved/nearly resolved on left side and partially improved on right side compared to prior exam 9/17/2024. No acute infiltrates appearing. No significant pleural effusion. No pneumothorax. Unremarkable cardiac silhouette. No acute bone abnormality.  Chest CT (1/2025): IMPRESSION:  1. Since November 1, 2024, new left lower lobe opacity and right greater than left small pleural effusions on background of unchanged perihilar fibrosis, probably inflammatory etiology such as organizing pneumonia.  2. No recurrence.   PFTs             12/18/24 1/2025 FEV1/FVC     70%               72% FEV1             1.39, 62%      1.28, 57% FVC               2.00, 69%      1.78, 62% TLC               -                      2.19, 51% RV                 -                      0.41, 24% DLCO            7.78. 38%       7.45, 36% DLadj            42% -12/2024: Positive bronchodilator response (FEV1 improved 160mL, 13%; FEV1/FVC normalizes)  6MWT: 12/2024: 402 meters (1319 feet), fartun SpO2 94% on room air 1/2025: 384 meters (1260 feet), fartun SpO2 96% on room air  A/P: 59 yo F h/o stage IIIB NSCLC (RUL mass, tracheal involvement N2 and N3 lymph nodes) s/p concurrent chemoradiation and Osimertinib), recent PE, and Afib returns to clinic after a recent hospitalization for cancer treatment-related pneumonitis.  Ms. Darby is clinically improving with cessation of Osimertinib and initiation of Prednisone. The pleural effusion is smaller than last week and not safely drainable today. She will resume the Apixaban. I would like her to continue Prednisone 50 mg for another week. Once her cough is resolved, we will start weaning by 5-10 mg increments on a weekly basis. Once below 20 mg, we will wean slower. I anticipate a 6-12 month course.  She is taking Bactrim daily. She has calcium and Vitamin D supplementation at home, though she is taking them inconsistently.  I would like to speak with her family next week to review symptoms, then see her back in 2 weeks.  1. Cancer treatment-related pneumonitis: radiation vs chemotherapy vs Osimertinib 2. NSCLC (stage IIIB) with EGFR mutation 3. Fatigue 4. h/o PE (6/2024) 5. Moderate COPD 6. R pleural effusion  -Prednisone 50 mg PO daily x1 more week; Prophylaxis with Bactrim SS daily -Hold Osimertinib; discussed with Dr. Ahuja -Famotidine 20 mg PO daily for GERD -OK to hold Symbicort if not providing significant relief -Calcium and Vitamin D supplementation -continue Apixaban 5 mg PO BID -Immunizations: not discussed today -follow-up with me in 2 weeks

## 2025-01-28 NOTE — PROCEDURE
[FreeTextEntry1] : Bedside Lung US (1/28/25): L: no effusion. R: small anechoic pleural effusion with "curtain sign" from the lung during respiration.

## 2025-02-12 NOTE — ADDENDUM
[FreeTextEntry1] : Addendum (Emilia; 2/12/25): I called Ms. Darby's daughter about her CXR:  CXR (2/2025): IMPRESSION: Bilateral perihilar/upper lung infiltrates partially improved compared to prior exam 12/18/2024. The pleural effusions have improved. No acute infiltrate appearing. No pneumothorax. Unremarkable cardiac silhouette.  I would continue her current Prednisone wean. If she develops symptoms related to the Prednisone, we can consider tapering more quickly.

## 2025-02-12 NOTE — HISTORY OF PRESENT ILLNESS
[Never] : never [TextBox_4] : Ms. America Darby returned to clinic today in follow-up for lung cancer with treatment-related pneumonitis; she was accompanied by her daughter, who supplements her history. In review, Ms. Darby is a 57 yo F h/o NSCLC (adenocarcinoma, stage IIIB (RUL, tracheal involvement, mediastinal and supraclavicular lymph nodes; EGFR mutation)) s/p concurrent chemoradiation with adjuvant Osimertinib who was hospitalized in 9/2024 for cough, progressive exertional dyspnea, and acute hypoxemic respiratory failure. She underwent bronchoscopy that ruled out alveolar hemorrhage; organizing pneumonia on biopsy and the clinical presentation favored drug related pneumonitis. With 2 mg/kg Methylprednisolone, she improved. A small R pleural effusion was noted during hospitalization, though not amenable to thoracentesis.  Cancer treatment course 5/23/2024: C1 cisplatin/pemetrexed RT start 6/3/2024 6/13/24 C2 cisplatin/pemetrexed 7/10/2024: C3 cisplatin/pemetrexed completed 7/18/2024: Last day of RT 8/24/2024: Osimertinib 80 mg Qd-. 12/2024: Osimertinib re-trial: 40 mg PO daily 1/2025: Symptom and radiographic recurrence; Osimertinib discontinuation   10/2024: Since discharge, Ms. Darby has felt steadily better. She is no longer using supplemental oxygen. She is able to walk at a normal pace for 30 minutes before having to rest. Her appetite and weight are stable. She is not having cough. With Prednisone, she is having some epigastric burning.  10/2024: Ms. Darby has continued to feel better on her steroid taper, currently at Prednisone 25 mg PO daily. She has remained active via walking 30 minutes/day. She has some dyspnea when walking up hills. Her appetite and weight are stable. She has noticed some blurriness in her vision, which she associates with cataracts. She has an Ophthalmology visit next week.  12/18/24 She is doing well she restarted the Osimertinib about a month ago and stopped the prednisone about a week ago. She feels well about the same without any worsening of her symptoms. She walks everyday for about 30 minutes but gets short of breath after about 3 flights of stairs. Denies any recent fevers, chills, infections. Still has a dry cough that comes and goes without any predilection for time of day.   1/2025: I spoke to Ms. Darby's daughter today. Since our last visit, she has had worsened cough and exertional dyspnea. She is not having fever, chills, or sputum production. She is taking the Symbicort inconsistently. She was evaluated by her cardiologist and had an echocardiogram recently, which were reportedly normal.  1/2025: Ms. Darby reports feeling "ok" today. Her daughter notes continued daily cough that is persistent. She has dyspnea when climbing stairs and walking long distances. Her appetite and weight are stable. No fever or chills.  1/28/25: I saw Ms. Darby in follow-up today. She was accompanied by her daughter, who supplements her history. Ms. Darby remains on Prednisone and has felt steadily better over the last week. She has persistent dry cough, though her exertional dyspnea and fatigue are improving.   2/11/25: Ms. Darby feels nearly back to baseline. Her cough has resolved, and her exercise tolerance is increased. She is currently taking Prednisone 50 mg PO daily, Bactrim SS daily, and supplemental Calcium/Vitamin D.   PMH: NSCLC (adenocarcinoma, stage IIIB (RUL, tracheal involvement, mediastinal and supraclavicular lymph nodes; EGFR mutation) PE (LLL; 7/2024) Afib s/p ablation  SH: Never smoker. History of second-hand smoke exposure.

## 2025-02-12 NOTE — PROCEDURE
[FreeTextEntry1] : Bedside Lung US (2/11/25): L: no effusion. A-line pattern. R: no effusion. A-line pattern.

## 2025-02-12 NOTE — ASSESSMENT
[FreeTextEntry1] : Labs: 2/2025: WBC 15.3, Hgb 12.0, Plts 272 Na 141, K 5.1, Cl 101, HCO3 31, BUN/creat 20/0.90, glucose 111, Ca 0.90 Tbili 0.8, AST/ALT 26/17, Alk phos 17, TP/Albumin 6.9/3.6  RVP (1/21/25): negative  Bronchoscopy (4/2024): BAL: POSITIVE FOR MALIGNANT CELLS. Non- small cell carcinoma.  Final Diagnosis Trachea, biopsy and touch preparation: -Adenocarcinoma of lung origin. Note: Immunostains performed shows that the tumor cells are positive for TTF-1 and negative for p40. This  staining profile supports the diagnosis.  Final Diagnosis LYMPH NODE, 4R, EBUS-GUIDED FNA POSITIVE FOR MALIGNANT CELLS. Metastatic non-small cell carcinoma. Cell block shows similar findings. Positive EGFR mutation   Bronchoscopy (9/2024): WBC 38 (L15, P12, M8, Eos 3)  BRONCHOALVEOLAR LAVAGE, RIGHT, LOWER LOBE Final Diagnosis LUNG, RIGHT LOWER LOBE, BAL: NEGATIVE FOR MALIGNANT CELLS Benign bronchial cells, alveolar macrophages, inflammatory cells. The cellblock shows similar findings.  Transbronchial biopsy: Final Diagnosis 1. Lung , right lower lobe, biopsy: - A minute fragment of lung tissue with reactive changes and focal areas suggestive of an organizing pneumonia.  Bacterial culture: negative AFB culture: NGTD Fungus culture: NGTD  BAL PJP PCR negative.  PET/CT (4/2024): Right upper lobe lung mass shows significant metabolic activity and likely malignant. Bilateral supraclavicular, mediastinal and bilateral hilar adenopathy showing moderate-significant activity. Nodularity and thickening along the right major fissure in the upper lobe but no metabolic activity. Left adnexal cyst. In a postmenopausal patient, this can be followed by transvaginal ultrasound urgent basis. Bilateral cystic structures following there is sacral foramen which are probably perineural cysts and can be further evaluated with MRI of the sacrum.  Chest CT (6/26/24) 1. Nonocclusive segmental branch pulmonary artery embolism involving the lateral basal segment to the left lower lobe. No evidence of pulmonary hemorrhage or infarction. No pulmonary artery dilatation or right ventricular strain. Loculated right upper lobe neoplasm which may be minimally smaller in size. Stable confluent mediastinal lymphadenopathy with near complete occlusion of the brachiocephalic confluence and proximal IVC  Ct Chest (8/2024): 1. Slight interval decrease in the size of a spiculated nodular neoplasm within the apical segment of the right upper lobe. 2. 2 new linear opacities within the apical segment of the right upper lobe as above. Short interval surveillance is suggested. 3. The remainder the study is unchanged with bulky mediastinal and perihilar lymphadenopathy with stable near complete slitlike occlusion of the superior vena cava and mild progressive narrowing of the right main and right upper lobe bronchus.  CT PE (9/2024): IMPRESSION: 1. No pulmonary embolism. 2. Since August 20, 2024, new bilateral symmetric consolidative and groundglass airspace disease with mid and upper lung field predominance. Differential for this pattern includes noncardiogenic pulmonary edema (including ARDS), hypersensitivity pneumonitis, cardiogenic pulmonary edema/CHF, pulmonary alveolar proteinosis, diffuse alveolar hemorrhage, or atypical pneumonia (including Pneumocystis jirovecii). 3. Mildly increased right pleural effusion.  Chest CT (11/2024): IMPRESSION: 1. Interval resolution of perihilar crazy paving opacities with newly developing perihilar consolidation which encases the bronchovascular bundles with newly developing traction bronchiectasis. This may represent the sequela of radiation fibrosis. Possibility of lymphangitic carcinomatosis cannot be excluded. 2. Mild interval improvement in diffuse infiltration of the mediastinal fat planes which may represent a mildly improving lymphadenopathy. 3. Limited evaluation of the vessels and inferior vena cava the absence of intravenous contrast.  CXR (12/2024) IMPRESSION: Lung infiltrates considerably improved/nearly resolved on left side and partially improved on right side compared to prior exam 9/17/2024. No acute infiltrates appearing. No significant pleural effusion. No pneumothorax. Unremarkable cardiac silhouette. No acute bone abnormality.  Chest CT (1/2025): IMPRESSION:  1. Since November 1, 2024, new left lower lobe opacity and right greater than left small pleural effusions on background of unchanged perihilar fibrosis, probably inflammatory etiology such as organizing pneumonia.  2. No recurrence.   PFTs             12/18/24 1/2025 FEV1/FVC     70%               72% FEV1             1.39, 62%      1.28, 57% FVC               2.00, 69%      1.78, 62% TLC               -                      2.19, 51% RV                 -                      0.41, 24% DLCO            7.78. 38%       7.45, 36% DLadj            42% -12/2024: Positive bronchodilator response (FEV1 improved 160mL, 13%; FEV1/FVC normalizes)  6MWT: 12/2024: 402 meters (1319 feet), fartun SpO2 94% on room air 1/2025: 384 meters (1260 feet), fartun SpO2 96% on room air  A/P: 59 yo F h/o stage IIIB NSCLC (RUL mass, tracheal involvement N2 and N3 lymph nodes) s/p concurrent chemoradiation and Osimertinib), recent PE, and Afib returns to clinic after a recent hospitalization for cancer treatment-related pneumonitis.  With re-introduction of Osimertinib, Ms. Darby's symptoms and radiographic opacities recurred. With Prednisone 1 mg/kg, her symptoms have resolved. Her R>L pleural effusions have also resolved today on US. We will pursue a slow Prednisone taper for recurrent Osimertinib-related pneumonitis (OP pattern). Osimertinib has been discontinued.  To prevent recurrence, we will wean the Prednisone more slowly than her prior taper: 5 mg every 2 weeks. We will begin weaning tomorrow (45 mg).  We will obtain a CXR today to ensure no focal opacities and a chest CT in 3 months.  She is taking Bactrim SS, calcium, and Vitamin D daily.  1. Cancer treatment-related pneumonitis: radiation vs chemotherapy vs Osimertinib 2. NSCLC (stage IIIB) with EGFR mutation 3. Fatigue 4. h/o PE (6/2024) 5. Moderate COPD 6. R pleural effusion, resolved  -Prednisone 45 mg PO daily, then wean by 5 mg q2 weeks; she will call me if her mother develops side effects -Prophylaxis with Bactrim SS daily; BMP wnl today -Calcium and Vitamin D supplementation -Hold Osimertinib; discussed with Dr. Ahuja -Famotidine 20 mg PO daily for GERD -OK to hold Symbicort if not providing significant relief -continue Apixaban 5 mg PO BID -CXR today; chest CT in 3 months (4/2025). -Immunizations: not discussed today -follow-up with me in 2 weeks

## 2025-02-13 NOTE — REVIEW OF SYSTEMS
[Wheezing] : no wheezing [SOB on Exertion] : shortness of breath during exertion [de-identified] : + dry skin [Insomnia] : insomnia [Fever] : no fever [Chills] : no chills [Night Sweats] : no night sweats [Vision Problems] : no vision problems [Dysphagia] : no dysphagia [Odynophagia] : no odynophagia [Chest Pain] : no chest pain [Palpitations] : no palpitations [Shortness Of Breath] : no shortness of breath [Cough] : no cough [Abdominal Pain] : no abdominal pain [Vomiting] : no vomiting [Muscle Pain] : no muscle pain [Skin Rash] : no skin rash [Dizziness] : no dizziness [Fainting] : no fainting [Easy Bleeding] : no tendency for easy bleeding [Easy Bruising] : no tendency for easy bruising [FreeTextEntry2] : + weight gain

## 2025-02-13 NOTE — ASSESSMENT
[FreeTextEntry1] : America Darby is a 58-year-old female with no significant prior PMH who presents for follow up of for lung adenocarcinoma, [id number] - Stage IIIB, EGFR L858R.   # Stage IIIB Lung Adenocarcinoma with supraclavicular, mediastinal and bilateral hilar adenopathy  - EGFR exon 21 L858R mutation positive on NGS - PET/CT on 04/06/2024 - RUL lung mass shows significant metabolic activity, B/L supraclavicular, mediastinal and b/l hilar adenopathy showing moderate-significant metabolic activity; Nodularity and thickening along the right major fissure in the upper lobe but no metabolic activity; Left adnexal cyst.;B/L cystic structures following there is sacral foramen which are probably perineural cysts and can be further evaluated with MRI of the sacrum. - 4/12/2024 - FB EBUS biopsy on 04/12/2024 - Standard of care treatment for stage IIIB lung cancer is concurrent chemoradiation (prefer cisplatin/pemetrexed for adenocarcinoma histology), followed by immunotherapy, though given EGFR mutation, we will substitute tagrisso for maintenance therapy after concurrent chemoradaition despite PACIFIC trial results.  Recent approval based on LEE trial - Tagrisso post CCRT with significant prolongation of PFS.  Completed cisplatin/pemetrexed every 3 weeks x  3 cycles with concurrent RT, was on osimertinib maintenance therapy with plan to continue indefinitely per LEE trail results (June 2024) however, the patient developed pneumonitis and treatment was held from 9/14/24-11/14/24. Tagrisso was rechallenged at 40mg daily, however, she again developed pneumonitis and the drug was discontinued.  --there is lack of sufficient data surrounding efficacy of further dose reduced Tagrisso (40mg qOD) --given recurrent pneumonitis, despite rechallenging drug at reduced dose, will discontinue Tagrisso --will monitor disease with CT scan in 2 months --if progresses to stage IV, there are multiple established treatment options  # pulmonary embolism --likely hypercoagulability in setting of malignancy --continue Eliquis 5mg BID  RTC in 2 months to review scans Care will transition to Dr. Mason Lloyd  Please see attending physician attestation below.

## 2025-02-13 NOTE — REVIEW OF SYSTEMS
[Wheezing] : no wheezing [SOB on Exertion] : shortness of breath during exertion [de-identified] : + dry skin [Insomnia] : insomnia [Fever] : no fever [Chills] : no chills [Night Sweats] : no night sweats [Vision Problems] : no vision problems [Dysphagia] : no dysphagia [Odynophagia] : no odynophagia [Chest Pain] : no chest pain [Palpitations] : no palpitations [Shortness Of Breath] : no shortness of breath [Cough] : no cough [Abdominal Pain] : no abdominal pain [Vomiting] : no vomiting [Muscle Pain] : no muscle pain [Skin Rash] : no skin rash [Dizziness] : no dizziness [Fainting] : no fainting [Easy Bleeding] : no tendency for easy bleeding [Easy Bruising] : no tendency for easy bruising [FreeTextEntry2] : + weight gain

## 2025-02-13 NOTE — ASSESSMENT
[FreeTextEntry1] : America Darby is a 58-year-old female with no significant prior PMH who presents for follow up of for lung adenocarcinoma, [id number] - Stage IIIB, EGFR L858R.   # Stage IIIB Lung Adenocarcinoma with supraclavicular, mediastinal and bilateral hilar adenopathy  - EGFR exon 21 L858R mutation positive on NGS - PET/CT on 04/06/2024 - RUL lung mass shows significant metabolic activity, B/L supraclavicular, mediastinal and b/l hilar adenopathy showing moderate-significant metabolic activity; Nodularity and thickening along the right major fissure in the upper lobe but no metabolic activity; Left adnexal cyst.;B/L cystic structures following there is sacral foramen which are probably perineural cysts and can be further evaluated with MRI of the sacrum. - 4/12/2024 - FB EBUS biopsy on 04/12/2024 - Standard of care treatment for stage IIIB lung cancer is concurrent chemoradiation (prefer cisplatin/pemetrexed for adenocarcinoma histology), followed by immunotherapy, though given EGFR mutation, we will substitute tagrisso for maintenance therapy after concurrent chemoradaition despite PACIFIC trial results.  Recent approval based on LEE trial - Tagrisso post CCRT with significant prolongation of PFS.  Completed cisplatin/pemetrexed every 3 weeks x  3 cycles with concurrent RT, was on osimertinib maintenance therapy with plan to continue indefinitely per LEE trail results (June 2024) however, the patient developed pneumonitis and treatment was held from 9/14/24-11/14/24. Tagrisso was rechallenged at 40mg daily, however, she again developed pneumonitis and the drug was discontinued.  --there is lack of sufficient data surrounding efficacy of further dose reduced Tagrisso (40mg qOD) --given recurrent pneumonitis, despite rechallenging drug at reduced dose, will discontinue Tagrisso --will monitor disease with CT scan in 2 months --if progresses to stage IV, there are multiple established treatment options  # pulmonary embolism --likely hypercoagulability in setting of malignancy --continue Eliquis 5mg BID  RTC in 2 months to review scans Care will transition to Dr. Mason Lloyd  Please see attending physician attestation below.

## 2025-02-13 NOTE — HISTORY OF PRESENT ILLNESS
[Disease: _____________________] : Disease: [unfilled] [T: ___] : T[unfilled] [N: ___] : N[unfilled] [M: ___] : M[unfilled] [AJCC Stage: ____] : AJCC Stage: [unfilled] [90: Able to carry normal activity; minor signs or symptoms of disease.] : 90: Able to carry normal activity; minor signs or symptoms of disease.  [ECOG Performance Status: 1 - Restricted in physically strenuous activity but ambulatory and able to carry out work of a light or sedentary nature] : Performance Status: 1 - Restricted in physically strenuous activity but ambulatory and able to carry out work of a light or sedentary nature, e.g., light house work, office work [de-identified] : America Darby is a 58-year-old female who presents to the clinic for follow up of stage IIIB NSCLC - adenocarcinoma histology (EGFR L858R).  Onx Hx: History obtained via translation from daughters Patient noted to have a cough in September 2023, initially thought to be URI, nonresponsive to steroids, zeepack/antibiotics. Further workup (see below) showed RUL nodule with mediastinal and right hilar lymphadenopathy, and bronchoscopy performed in April confirmed diagnosis of lung adenocarcinoma.  FH Noncontributory  SH nonsmoker, second hand smoking from . No alcohol, other drugs, or other supplements [facility] - stopped working since daughters prefer her not to travel Lives in [place] Fully functional at home  2/29/24: CTX: Opacity in the medial right upper lobe, right paratracheal region.  3/22/24: CT: Medial subpleural right upper lobe mildy spiculated nodule. Mediastinal and right hilar lymphadenopathy as described, most notably a necrotic right paratracheal jean pierre conglomerate. These findings are suspicious for primary lung malignancy. Question of direct intravascular invasion involving the superior vena cava and left branchiocephalic vein.  4/6/24: PET: Right upper lobe lung mass shows significant metabolic activity and likely malignant. Bilateral supraclavicular, mediastinal and bilateral hilar adenopathy showing moderate-significant activity. Nodularity and thickening along the right major fissure in the upper lobe but no metabolic activity. Left adnexal cyst. In a postmenopausal patient, this can be followed by transvaginal ultrasound urgent basis. Bilateral cystic structures following there is sacral foramen which are probably perineural cysts amd can be further evaluated with MRI of the sacrum.  4/12/24 BAL RUL - non-small cell carcinoma, adenocarcinoma of lung origin  Results of FB EBUS biopsy performed on 04/12/2024 of the RUL bronchoalevolar lavage was positive for malignant cells and was a non-small cell carcinoma. The tracheal biopsy was an adenocarcinoma of lung origin. Lymph node 4R was positive for malignant cells and is a metastatic non-small cell carcinoma. Note: Immunostains performed shows that the tumor cells are positive Tier I: Variants of Strong Clinical Significance  EGFR p.([id number]) Tier II: Variants of Potential Clinical Significance  TP53 p.([id number]) Tier III: Variants of Unknown Clinical Significance CDKN2A p.([id number]) PD-L1 1-2% 4/29/24 MRIB - negative for metastatic disease  6/26/24: CT Chest: 1. Nonocclusive segmental branch pulmonary artery embolism involving the lateral basal segment to the left lower lobe. No evidence of pulmonary hemorrhage or infarction. No pulmonary artery dilatation or right ventricular strain. 2. Loculated right upper lobe neoplasm which may be minimally smaller in size as described above. 3. Stable confluent mediastinal lymphadenopathy with near complete occlusion of the brachiocephalic confluence and proximal IVC. The results of this examination were verbally communicated with read back to the physician, LILLIAN GREEN, on on 6/26/2024 at 12:08 PM.   8/20/24: Ct Chest:  1. Slight interval decrease in the size of a spiculated nodular neoplasm within the apical segment of the right upper lobe. 2. 2 new linear opacities within the apical segment of the right upper lobe as above. Short interval surveillance is suggested. 3. The remainder the study is unchanged with bulky mediastinal and perihilar lymphadenopathy with stable near complete slitlike occlusion of the superior vena cava and mild progressive narrowing of the right main and right upper lobe bronchus.   Ms. Darby recently presented to the Emergency Department (ED) with a 3-4 day history of dyspnea and dry cough. She was found to be hypoxic in the ED, oxygen sat of 90% on room air. CT scan revealed new bilateral symmetric consolidative and ground-glass airspace disease, predominantly in the mid and upper lung fields, raising concerns for radiation pneumonitis. She was started on high dose steroids and HFNC. A transbronchial biopsy was performed, which indicated organizing pneumonia.   She was gradually weaned down to room air, requiring 2 liters of oxygen on exertion. She was discharged on a tapering dose of steroids with Pneumocystis jirovecii pneumonia (PJP) prophylaxis.   11/1/24: CT Chest:  1. Interval resolution of perihilar crazy paving opacities with newly developing perihilar consolidation which encases the bronchovascular bundles with newly developing traction bronchiectasis. This may represent the sequela of radiation fibrosis. Possibility of lymphangitic carcinomatosis cannot be excluded. 2. Mild interval improvement in diffuse infiltration of the mediastinal fat planes which may represent a mildly improving lymphadenopathy. 3. Limited evaluation of the vessels and inferior vena cava the absence of intravenous contrast.  [de-identified] : Adenocarcinoma - PD-L1 1-2%, EGFR L858R [de-identified] : CTSx: Dr.Lee Yorkc: Dr.Wernicke [FreeTextEntry1] : . 5/23/24: C1 cisplatin/pemetrexed RT start 6/3/2024 6/13/24   C2 cisplatin/pemetrexed  7/10/24: C3 cisplatin/pemetrexed completed  7/18/24: Last day of RT 8/24/24: Tagrisso 80 mg daily 9/14/24: Tagrisso HELD for pneumonitis 11/15/24: restarted Tagrisso 40mg (reduced dose) 1/28/25: discontinued Tagrisso (pneumonitis) [de-identified] : Accompanied by daughter Tatyana, aiding in Cantonese interpretation. Saw pulmonlogist Dr. Nieto in interim. Tagrisso is still discontinued as patient recovers from pneumonitis. She is currently on prednisone 50mg daily, with good effect. Particularly, SOB and cough have now resolved. Previously could not climb 1 flight of stairs. Now able to walk for 30+ minutes. She notes insomnia and some moon facies. Otherwise, no new complaints.

## 2025-02-13 NOTE — ASSESSMENT
[FreeTextEntry1] : America Darby is a 58-year-old female with no significant prior PMH who presents for follow up of for lung adenocarcinoma, [id number] - Stage IIIB, EGFR L858R.   # Stage IIIB Lung Adenocarcinoma with supraclavicular, mediastinal and bilateral hilar adenopathy  - EGFR exon 21 L858R mutation positive on NGS - PET/CT on 04/06/2024 - RUL lung mass shows significant metabolic activity, B/L supraclavicular, mediastinal and b/l hilar adenopathy showing moderate-significant metabolic activity; Nodularity and thickening along the right major fissure in the upper lobe but no metabolic activity; Left adnexal cyst.;B/L cystic structures following there is sacral foramen which are probably perineural cysts and can be further evaluated with MRI of the sacrum. - 4/12/2024 - FB EBUS biopsy on 04/12/2024 - Standard of care treatment for stage IIIB lung cancer is concurrent chemoradiation (prefer cisplatin/pemetrexed for adenocarcinoma histology), followed by immunotherapy, though given EGFR mutation, we will substitute tagrisso for maintenance therapy after concurrent chemoradaition despite PACIFIC trial results.  Recent approval based on LEE trial - Tagrisso post CCRT with significant prolongation of PFS.  Completed cisplatin/pemetrexed every 3 weeks x  3 cycles with concurrent RT, was on osimertinib maintenance therapy with plan to continue indefinitely per LEE trail results (June 2024) however, the patient developed pneumonitis and treatment was held from 9/14/24-11/14/24. Tagrisso was rechallenged at 40mg daily, however, she again developed pneumonitis and the drug was discontinued.  --there is lack of sufficient data surrounding efficacy of further dose reduced Tagrisso (40mg qOD) --given recurrent pneumonitis, despite rechallenging drug at reduced dose, will discontinue Tagrisso --will monitor disease with CT scan in 2 months --if progresses to stage IV, there are multiple established treatment options  # pulmonary embolism --likely hypercoagulability in setting of malignancy --continue Eliquis 5mg BID  RTC in 2 months to review scans Care will transition to Dr. Mason Lloyd  Please see attending physician attestation below.

## 2025-02-13 NOTE — PHYSICAL EXAM
[Normal] : affect appropriate [de-identified] : EOMI, no conjunctival injection, anicteric [de-identified] : + moon facies [de-identified] : supple [de-identified] : No calf tenderness or swelling.

## 2025-02-13 NOTE — HISTORY OF PRESENT ILLNESS
[Disease: _____________________] : Disease: [unfilled] [T: ___] : T[unfilled] [N: ___] : N[unfilled] [M: ___] : M[unfilled] [AJCC Stage: ____] : AJCC Stage: [unfilled] [90: Able to carry normal activity; minor signs or symptoms of disease.] : 90: Able to carry normal activity; minor signs or symptoms of disease.  [ECOG Performance Status: 1 - Restricted in physically strenuous activity but ambulatory and able to carry out work of a light or sedentary nature] : Performance Status: 1 - Restricted in physically strenuous activity but ambulatory and able to carry out work of a light or sedentary nature, e.g., light house work, office work [de-identified] : America Darby is a 58-year-old female who presents to the clinic for follow up of stage IIIB NSCLC - adenocarcinoma histology (EGFR L858R).  Onx Hx: History obtained via translation from daughters Patient noted to have a cough in September 2023, initially thought to be URI, nonresponsive to steroids, zeepack/antibiotics. Further workup (see below) showed RUL nodule with mediastinal and right hilar lymphadenopathy, and bronchoscopy performed in April confirmed diagnosis of lung adenocarcinoma.  FH Noncontributory  SH nonsmoker, second hand smoking from . No alcohol, other drugs, or other supplements [employer] - stopped working since daughters prefer her not to travel Lives in [place] Fully functional at home  2/29/24: CTX: Opacity in the medial right upper lobe, right paratracheal region.  3/22/24: CT: Medial subpleural right upper lobe mildy spiculated nodule. Mediastinal and right hilar lymphadenopathy as described, most notably a necrotic right paratracheal jean pierre conglomerate. These findings are suspicious for primary lung malignancy. Question of direct intravascular invasion involving the superior vena cava and left branchiocephalic vein.  4/6/24: PET: Right upper lobe lung mass shows significant metabolic activity and likely malignant. Bilateral supraclavicular, mediastinal and bilateral hilar adenopathy showing moderate-significant activity. Nodularity and thickening along the right major fissure in the upper lobe but no metabolic activity. Left adnexal cyst. In a postmenopausal patient, this can be followed by transvaginal ultrasound urgent basis. Bilateral cystic structures following there is sacral foramen which are probably perineural cysts amd can be further evaluated with MRI of the sacrum.  4/12/24 BAL RUL - non-small cell carcinoma, adenocarcinoma of lung origin  Results of FB EBUS biopsy performed on 04/12/2024 of the RUL bronchoalevolar lavage was positive for malignant cells and was a non-small cell carcinoma. The tracheal biopsy was an adenocarcinoma of lung origin. Lymph node 4R was positive for malignant cells and is a metastatic non-small cell carcinoma. Note: Immunostains performed shows that the tumor cells are positive Tier I: Variants of Strong Clinical Significance  EGFR p.([id number]) Tier II: Variants of Potential Clinical Significance  TP53 p.([id number]) Tier III: Variants of Unknown Clinical Significance CDKN2A p.([id number]) PD-L1 1-2% 4/29/24 MRIB - negative for metastatic disease  6/26/24: CT Chest: 1. Nonocclusive segmental branch pulmonary artery embolism involving the lateral basal segment to the left lower lobe. No evidence of pulmonary hemorrhage or infarction. No pulmonary artery dilatation or right ventricular strain. 2. Loculated right upper lobe neoplasm which may be minimally smaller in size as described above. 3. Stable confluent mediastinal lymphadenopathy with near complete occlusion of the brachiocephalic confluence and proximal IVC. The results of this examination were verbally communicated with read back to the physician, LILLIAN GREEN, on on 6/26/2024 at 12:08 PM.   8/20/24: Ct Chest:  1. Slight interval decrease in the size of a spiculated nodular neoplasm within the apical segment of the right upper lobe. 2. 2 new linear opacities within the apical segment of the right upper lobe as above. Short interval surveillance is suggested. 3. The remainder the study is unchanged with bulky mediastinal and perihilar lymphadenopathy with stable near complete slitlike occlusion of the superior vena cava and mild progressive narrowing of the right main and right upper lobe bronchus.   Ms. Darby recently presented to the Emergency Department (ED) with a 3-4 day history of dyspnea and dry cough. She was found to be hypoxic in the ED, oxygen sat of 90% on room air. CT scan revealed new bilateral symmetric consolidative and ground-glass airspace disease, predominantly in the mid and upper lung fields, raising concerns for radiation pneumonitis. She was started on high dose steroids and HFNC. A transbronchial biopsy was performed, which indicated organizing pneumonia.   She was gradually weaned down to room air, requiring 2 liters of oxygen on exertion. She was discharged on a tapering dose of steroids with Pneumocystis jirovecii pneumonia (PJP) prophylaxis.   11/1/24: CT Chest:  1. Interval resolution of perihilar crazy paving opacities with newly developing perihilar consolidation which encases the bronchovascular bundles with newly developing traction bronchiectasis. This may represent the sequela of radiation fibrosis. Possibility of lymphangitic carcinomatosis cannot be excluded. 2. Mild interval improvement in diffuse infiltration of the mediastinal fat planes which may represent a mildly improving lymphadenopathy. 3. Limited evaluation of the vessels and inferior vena cava the absence of intravenous contrast.  [de-identified] : Adenocarcinoma - PD-L1 1-2%, EGFR L858R [de-identified] : CTSx: Dr.Lee Yorkc: Dr.Wernicke [FreeTextEntry1] : . 5/23/24: C1 cisplatin/pemetrexed RT start 6/3/2024 6/13/24   C2 cisplatin/pemetrexed  7/10/24: C3 cisplatin/pemetrexed completed  7/18/24: Last day of RT 8/24/24: Tagrisso 80 mg daily 9/14/24: Tagrisso HELD for pneumonitis 11/15/24: restarted Tagrisso 40mg (reduced dose) 1/28/25: discontinued Tagrisso (pneumonitis) [de-identified] : Accompanied by daughter Tatyana, aiding in Cantonese interpretation. Saw pulmonlogist Dr. Nieto in interim. Tagrisso is still discontinued as patient recovers from pneumonitis. She is currently on prednisone 50mg daily, with good effect. Particularly, SOB and cough have now resolved. Previously could not climb 1 flight of stairs. Now able to walk for 30+ minutes. She notes insomnia and some moon facies. Otherwise, no new complaints.

## 2025-02-13 NOTE — PHYSICAL EXAM
[Normal] : affect appropriate [de-identified] : EOMI, no conjunctival injection, anicteric [de-identified] : + moon facies [de-identified] : supple [de-identified] : No calf tenderness or swelling.

## 2025-02-13 NOTE — HISTORY OF PRESENT ILLNESS
[Disease: _____________________] : Disease: [unfilled] [T: ___] : T[unfilled] [N: ___] : N[unfilled] [M: ___] : M[unfilled] [AJCC Stage: ____] : AJCC Stage: [unfilled] [90: Able to carry normal activity; minor signs or symptoms of disease.] : 90: Able to carry normal activity; minor signs or symptoms of disease.  [ECOG Performance Status: 1 - Restricted in physically strenuous activity but ambulatory and able to carry out work of a light or sedentary nature] : Performance Status: 1 - Restricted in physically strenuous activity but ambulatory and able to carry out work of a light or sedentary nature, e.g., light house work, office work [de-identified] : America Darby is a 58-year-old female who presents to the clinic for follow up of stage IIIB NSCLC - adenocarcinoma histology (EGFR L858R).  Onx Hx: History obtained via translation from daughters Patient noted to have a cough in September 2023, initially thought to be URI, nonresponsive to steroids, zeepack/antibiotics. Further workup (see below) showed RUL nodule with mediastinal and right hilar lymphadenopathy, and bronchoscopy performed in April confirmed diagnosis of lung adenocarcinoma.  FH Noncontributory  SH nonsmoker, second hand smoking from . No alcohol, other drugs, or other supplements [employer] - stopped working since daughters prefer her not to travel Lives in [place] Fully functional at home  2/29/24: CTX: Opacity in the medial right upper lobe, right paratracheal region.  3/22/24: CT: Medial subpleural right upper lobe mildy spiculated nodule. Mediastinal and right hilar lymphadenopathy as described, most notably a necrotic right paratracheal jean pierre conglomerate. These findings are suspicious for primary lung malignancy. Question of direct intravascular invasion involving the superior vena cava and left branchiocephalic vein.  4/6/24: PET: Right upper lobe lung mass shows significant metabolic activity and likely malignant. Bilateral supraclavicular, mediastinal and bilateral hilar adenopathy showing moderate-significant activity. Nodularity and thickening along the right major fissure in the upper lobe but no metabolic activity. Left adnexal cyst. In a postmenopausal patient, this can be followed by transvaginal ultrasound urgent basis. Bilateral cystic structures following there is sacral foramen which are probably perineural cysts amd can be further evaluated with MRI of the sacrum.  4/12/24 BAL RUL - non-small cell carcinoma, adenocarcinoma of lung origin  Results of FB EBUS biopsy performed on 04/12/2024 of the RUL bronchoalevolar lavage was positive for malignant cells and was a non-small cell carcinoma. The tracheal biopsy was an adenocarcinoma of lung origin. Lymph node 4R was positive for malignant cells and is a metastatic non-small cell carcinoma. Note: Immunostains performed shows that the tumor cells are positive Tier I: Variants of Strong Clinical Significance  EGFR p.([id number]) Tier II: Variants of Potential Clinical Significance  TP53 p.([id number]) Tier III: Variants of Unknown Clinical Significance CDKN2A p.([id number]) PD-L1 1-2% 4/29/24 MRIB - negative for metastatic disease  6/26/24: CT Chest: 1. Nonocclusive segmental branch pulmonary artery embolism involving the lateral basal segment to the left lower lobe. No evidence of pulmonary hemorrhage or infarction. No pulmonary artery dilatation or right ventricular strain. 2. Loculated right upper lobe neoplasm which may be minimally smaller in size as described above. 3. Stable confluent mediastinal lymphadenopathy with near complete occlusion of the brachiocephalic confluence and proximal IVC. The results of this examination were verbally communicated with read back to the physician, LILLIAN GREEN, on on 6/26/2024 at 12:08 PM.   8/20/24: Ct Chest:  1. Slight interval decrease in the size of a spiculated nodular neoplasm within the apical segment of the right upper lobe. 2. 2 new linear opacities within the apical segment of the right upper lobe as above. Short interval surveillance is suggested. 3. The remainder the study is unchanged with bulky mediastinal and perihilar lymphadenopathy with stable near complete slitlike occlusion of the superior vena cava and mild progressive narrowing of the right main and right upper lobe bronchus.   Ms. Darby recently presented to the Emergency Department (ED) with a 3-4 day history of dyspnea and dry cough. She was found to be hypoxic in the ED, oxygen sat of 90% on room air. CT scan revealed new bilateral symmetric consolidative and ground-glass airspace disease, predominantly in the mid and upper lung fields, raising concerns for radiation pneumonitis. She was started on high dose steroids and HFNC. A transbronchial biopsy was performed, which indicated organizing pneumonia.   She was gradually weaned down to room air, requiring 2 liters of oxygen on exertion. She was discharged on a tapering dose of steroids with Pneumocystis jirovecii pneumonia (PJP) prophylaxis.   11/1/24: CT Chest:  1. Interval resolution of perihilar crazy paving opacities with newly developing perihilar consolidation which encases the bronchovascular bundles with newly developing traction bronchiectasis. This may represent the sequela of radiation fibrosis. Possibility of lymphangitic carcinomatosis cannot be excluded. 2. Mild interval improvement in diffuse infiltration of the mediastinal fat planes which may represent a mildly improving lymphadenopathy. 3. Limited evaluation of the vessels and inferior vena cava the absence of intravenous contrast.  [de-identified] : Adenocarcinoma - PD-L1 1-2%, EGFR L858R [de-identified] : CTSx: Dr.Lee Yorkc: Dr.Wernicke [FreeTextEntry1] : . 5/23/24: C1 cisplatin/pemetrexed RT start 6/3/2024 6/13/24   C2 cisplatin/pemetrexed  7/10/24: C3 cisplatin/pemetrexed completed  7/18/24: Last day of RT 8/24/24: Tagrisso 80 mg daily 9/14/24: Tagrisso HELD for pneumonitis 11/15/24: restarted Tagrisso 40mg (reduced dose) 1/28/25: discontinued Tagrisso (pneumonitis) [de-identified] : Accompanied by daughter Tatyana, aiding in Cantonese interpretation. Saw pulmonlogist Dr. Nieto in interim. Tagrisso is still discontinued as patient recovers from pneumonitis. She is currently on prednisone 50mg daily, with good effect. Particularly, SOB and cough have now resolved. Previously could not climb 1 flight of stairs. Now able to walk for 30+ minutes. She notes insomnia and some moon facies. Otherwise, no new complaints.

## 2025-02-13 NOTE — REVIEW OF SYSTEMS
[Wheezing] : no wheezing [SOB on Exertion] : shortness of breath during exertion [de-identified] : + dry skin [Insomnia] : insomnia [Fever] : no fever [Chills] : no chills [Night Sweats] : no night sweats [Vision Problems] : no vision problems [Dysphagia] : no dysphagia [Odynophagia] : no odynophagia [Chest Pain] : no chest pain [Palpitations] : no palpitations [Shortness Of Breath] : no shortness of breath [Cough] : no cough [Abdominal Pain] : no abdominal pain [Vomiting] : no vomiting [Muscle Pain] : no muscle pain [Skin Rash] : no skin rash [Dizziness] : no dizziness [Fainting] : no fainting [Easy Bleeding] : no tendency for easy bleeding [Easy Bruising] : no tendency for easy bruising [FreeTextEntry2] : + weight gain

## 2025-02-13 NOTE — END OF VISIT
[] : Fellow [FreeTextEntry3] : Patient seen with oncology fellow, Dr.Peter Rajna.  59 yo F with stage IIIB NSCLC, EGFR L858R, s/p chemoRT completed 7/16, followed by adjuvant Tagrisso 80 mg QD per LEE trial here for follow up.  She completed chemo RT in the summer 2024, and per large trial she was started on adjuvant Tagrisso thereafter, unfortunately she then suffered likely radiation pneumonitis, for which she needed supplemental oxygen and a long steroid taper.  She was rechallenge with Tagrisso, half dose and was tolerating it for about 2 months, when a few weeks ago she again developed shortness of breath and cough.  A repeat scan showed development of drug-induced pneumonitis.  Tagrisso was again stopped and the patient was started on high-dose steroids by Dr. George, since then she has been exhibiting significant improvement in her clinical condition. The patient is here today to discuss next steps.  We discussed with the patient and the daughter that the fact that she is on adjuvant Tagrisso for her stage III non-small cell EGFR mutant lung cancer in order to prolong disease-free survival and overall survival post chemoradiation therapy there is no alternative to Tagrisso.  I find too risky to rechallenge her at this point, though case reports have been published and in some occasions with low body weight patients were rechallenge with Tagrisso 40 mg every other day.  However the risk of rechallenge might outweigh the benefit.  We also discussed general prognosis of stage III lung cancer, while we say it is curable, the most likelihood that majority of patients do recur within a few years of diagnosis.  Therefore due to the risk of Tagrisso rechallenge, and good alternative options if metastatic disease develops I recommend not rechallenging right now.  The patient and the daughter agree with this. Follow-up with Dr. Lloyd in 3 months.

## 2025-02-13 NOTE — END OF VISIT
[] : Fellow [FreeTextEntry3] : Patient seen with oncology fellow, Dr.Peter Rajan.  59 yo F with stage IIIB NSCLC, EGFR L858R, s/p chemoRT completed 7/16, followed by adjuvant Tagrisso 80 mg QD per LEE trial here for follow up.  She completed chemo RT in the summer 2024, and per large trial she was started on adjuvant Tagrisso thereafter, unfortunately she then suffered likely radiation pneumonitis, for which she needed supplemental oxygen and a long steroid taper.  She was rechallenge with Tagrisso, half dose and was tolerating it for about 2 months, when a few weeks ago she again developed shortness of breath and cough.  A repeat scan showed development of drug-induced pneumonitis.  Tagrisso was again stopped and the patient was started on high-dose steroids by Dr. George, since then she has been exhibiting significant improvement in her clinical condition. The patient is here today to discuss next steps.  We discussed with the patient and the daughter that the fact that she is on adjuvant Tagrisso for her stage III non-small cell EGFR mutant lung cancer in order to prolong disease-free survival and overall survival post chemoradiation therapy there is no alternative to Tagrisso.  I find too risky to rechallenge her at this point, though case reports have been published and in some occasions with low body weight patients were rechallenge with Tagrisso 40 mg every other day.  However the risk of rechallenge might outweigh the benefit.  We also discussed general prognosis of stage III lung cancer, while we say it is curable, the most likelihood that majority of patients do recur within a few years of diagnosis.  Therefore due to the risk of Tagrisso rechallenge, and good alternative options if metastatic disease develops I recommend not rechallenging right now.  The patient and the daughter agree with this. Follow-up with Dr. Lloyd in 3 months.

## 2025-02-13 NOTE — PHYSICAL EXAM
[Normal] : affect appropriate [de-identified] : EOMI, no conjunctival injection, anicteric [de-identified] : + moon facies [de-identified] : supple [de-identified] : No calf tenderness or swelling.

## 2025-03-12 NOTE — ADDENDUM
[FreeTextEntry1] : Addendum (Emilia; 3/11/25): CXR (3/2025): IMPRESSION: Similar appearance to prior exam 2/11/2025. No focal or acute infiltrate. No significant pleural effusion. No pneumothorax.  WBC 9.41, Hgb 12.4, Plts 280 Na 141, K 4.8, Cl 101, HCO3 28, BUN/creat 21/0.91, glucose 130, Ca 8.8 Tbili 0.2, AST/ALT 22/24, Alk phos 75, TP/Albumin 6.9/4.5 B12 730,  Folate 13.9

## 2025-03-12 NOTE — HISTORY OF PRESENT ILLNESS
[Never] : never [TextBox_4] : Ms. America Darby returned to clinic today in follow-up for lung cancer with treatment-related pneumonitis; she was accompanied by her son-in-law, who supplements her history. Our visit was supported by a Cantonese . In review, Ms. Darby is a 59 yo F h/o NSCLC (adenocarcinoma, stage IIIB (RUL, tracheal involvement, mediastinal and supraclavicular lymph nodes; EGFR mutation)) s/p concurrent chemoradiation with adjuvant Osimertinib who was hospitalized in 9/2024 for cough, progressive exertional dyspnea, and acute hypoxemic respiratory failure. She underwent bronchoscopy that ruled out alveolar hemorrhage; organizing pneumonia on biopsy and the clinical presentation favored drug related pneumonitis. With 2 mg/kg Methylprednisolone, she improved. A small R pleural effusion was noted during hospitalization, though not amenable to thoracentesis. With re-initiation of Osimertinib, her lung opacities and cough recurred.  Cancer treatment course 5/23/2024: C1 cisplatin/pemetrexed RT start 6/3/2024 6/13/24 C2 cisplatin/pemetrexed 7/10/2024: C3 cisplatin/pemetrexed completed 7/18/2024: Last day of RT 8/24/2024: Osimertinib 80 mg Qd-. 12/2024: Osimertinib re-trial: 40 mg PO daily 1/2025: Symptom and radiographic recurrence; Osimertinib discontinuation  10/2024: Since discharge, Ms. Darby has felt steadily better. She is no longer using supplemental oxygen. She is able to walk at a normal pace for 30 minutes before having to rest. Her appetite and weight are stable. She is not having cough. With Prednisone, she is having some epigastric burning.  10/2024: Ms. Darby has continued to feel better on her steroid taper, currently at Prednisone 25 mg PO daily. She has remained active via walking 30 minutes/day. She has some dyspnea when walking up hills. Her appetite and weight are stable. She has noticed some blurriness in her vision, which she associates with cataracts. She has an Ophthalmology visit next week.  12/18/24 She is doing well she restarted the Osimertinib about a month ago and stopped the prednisone about a week ago. She feels well about the same without any worsening of her symptoms. She walks everyday for about 30 minutes but gets short of breath after about 3 flights of stairs. Denies any recent fevers, chills, infections. Still has a dry cough that comes and goes without any predilection for time of day.   1/2025: I spoke to Ms. Darby's daughter today. Since our last visit, she has had worsened cough and exertional dyspnea. She is not having fever, chills, or sputum production. She is taking the Symbicort inconsistently. She was evaluated by her cardiologist and had an echocardiogram recently, which were reportedly normal.  1/2025: Ms. Darby reports feeling "ok" today. Her daughter notes continued daily cough that is persistent. She has dyspnea when climbing stairs and walking long distances. Her appetite and weight are stable. No fever or chills.  1/28/25: I saw Ms. Darby in follow-up today. She was accompanied by her daughter, who supplements her history. Ms. Darby remains on Prednisone and has felt steadily better over the last week. She has persistent dry cough, though her exertional dyspnea and fatigue are improving.   2/11/25: Ms. Darby feels nearly back to baseline. Her cough has resolved, and her exercise tolerance is increased. She is currently taking Prednisone 50 mg PO daily, Bactrim SS daily, and supplemental Calcium/Vitamin D.  3/11/25: Ms. Darby continues to feel well. She is weaning her Prednisone regularly. Her cough and dyspnea have resolved. She is walking regularly and climbing stairs without difficulty. She endorses occasional epigastric burning, which responds to taking Tums. Her weight and appetite are stable.  PMH: NSCLC (adenocarcinoma, stage IIIB (RUL, tracheal involvement, mediastinal and supraclavicular lymph nodes; EGFR mutation) PE (LLL; 7/2024) Afib s/p ablation  SH: Never smoker. History of second-hand smoke exposure.

## 2025-03-12 NOTE — ASSESSMENT
[FreeTextEntry1] : Labs: 2/2025: WBC 15.3, Hgb 12.0, Plts 272 Na 141, K 5.1, Cl 101, HCO3 31, BUN/creat 20/0.90, glucose 111, Ca 0.90 Tbili 0.8, AST/ALT 26/17, Alk phos 17, TP/Albumin 6.9/3.6  RVP (1/21/25): negative  Bronchoscopy (4/2024): BAL: POSITIVE FOR MALIGNANT CELLS. Non- small cell carcinoma.  Final Diagnosis Trachea, biopsy and touch preparation: -Adenocarcinoma of lung origin. Note: Immunostains performed shows that the tumor cells are positive for TTF-1 and negative for p40. This  staining profile supports the diagnosis.  Final Diagnosis LYMPH NODE, 4R, EBUS-GUIDED FNA POSITIVE FOR MALIGNANT CELLS. Metastatic non-small cell carcinoma. Cell block shows similar findings. Positive EGFR mutation   Bronchoscopy (9/2024): WBC 38 (L15, P12, M8, Eos 3)  BRONCHOALVEOLAR LAVAGE, RIGHT, LOWER LOBE Final Diagnosis LUNG, RIGHT LOWER LOBE, BAL: NEGATIVE FOR MALIGNANT CELLS Benign bronchial cells, alveolar macrophages, inflammatory cells. The cellblock shows similar findings.  Transbronchial biopsy: Final Diagnosis 1. Lung , right lower lobe, biopsy: - A minute fragment of lung tissue with reactive changes and focal areas suggestive of an organizing pneumonia.  Bacterial culture: negative AFB culture: NGTD Fungus culture: NGTD  BAL PJP PCR negative.  PET/CT (4/2024): Right upper lobe lung mass shows significant metabolic activity and likely malignant. Bilateral supraclavicular, mediastinal and bilateral hilar adenopathy showing moderate-significant activity. Nodularity and thickening along the right major fissure in the upper lobe but no metabolic activity. Left adnexal cyst. In a postmenopausal patient, this can be followed by transvaginal ultrasound urgent basis. Bilateral cystic structures following there is sacral foramen which are probably perineural cysts and can be further evaluated with MRI of the sacrum.  Chest CT (6/26/24) 1. Nonocclusive segmental branch pulmonary artery embolism involving the lateral basal segment to the left lower lobe. No evidence of pulmonary hemorrhage or infarction. No pulmonary artery dilatation or right ventricular strain. Loculated right upper lobe neoplasm which may be minimally smaller in size. Stable confluent mediastinal lymphadenopathy with near complete occlusion of the brachiocephalic confluence and proximal IVC  Ct Chest (8/2024): 1. Slight interval decrease in the size of a spiculated nodular neoplasm within the apical segment of the right upper lobe. 2. 2 new linear opacities within the apical segment of the right upper lobe as above. Short interval surveillance is suggested. 3. The remainder the study is unchanged with bulky mediastinal and perihilar lymphadenopathy with stable near complete slitlike occlusion of the superior vena cava and mild progressive narrowing of the right main and right upper lobe bronchus.  CT PE (9/2024): IMPRESSION: 1. No pulmonary embolism. 2. Since August 20, 2024, new bilateral symmetric consolidative and groundglass airspace disease with mid and upper lung field predominance. Differential for this pattern includes noncardiogenic pulmonary edema (including ARDS), hypersensitivity pneumonitis, cardiogenic pulmonary edema/CHF, pulmonary alveolar proteinosis, diffuse alveolar hemorrhage, or atypical pneumonia (including Pneumocystis jirovecii). 3. Mildly increased right pleural effusion.  Chest CT (11/2024): IMPRESSION: 1. Interval resolution of perihilar crazy paving opacities with newly developing perihilar consolidation which encases the bronchovascular bundles with newly developing traction bronchiectasis. This may represent the sequela of radiation fibrosis. Possibility of lymphangitic carcinomatosis cannot be excluded. 2. Mild interval improvement in diffuse infiltration of the mediastinal fat planes which may represent a mildly improving lymphadenopathy. 3. Limited evaluation of the vessels and inferior vena cava the absence of intravenous contrast.  CXR (12/2024) IMPRESSION: Lung infiltrates considerably improved/nearly resolved on left side and partially improved on right side compared to prior exam 9/17/2024. No acute infiltrates appearing. No significant pleural effusion. No pneumothorax. Unremarkable cardiac silhouette. No acute bone abnormality.  Chest CT (1/2025): IMPRESSION:  1. Since November 1, 2024, new left lower lobe opacity and right greater than left small pleural effusions on background of unchanged perihilar fibrosis, probably inflammatory etiology such as organizing pneumonia.  2. No recurrence.  CXR (2/2025): IMPRESSION: Bilateral perihilar/upper lung infiltrates partially improved compared to prior exam 12/18/2024. The pleural effusions have improved. No acute infiltrate appearing. No pneumothorax. Unremarkable cardiac silhouette.   PFTs             12/18/24 1/2025 FEV1/FVC     70%               72% FEV1             1.39, 62%      1.28, 57% FVC               2.00, 69%      1.78, 62% TLC               -                      2.19, 51% RV                 -                      0.41, 24% DLCO            7.78. 38%       7.45, 36% DLadj            42% -12/2024: Positive bronchodilator response (FEV1 improved 160mL, 13%; FEV1/FVC normalizes)  6MWT: 12/2024: 402 meters (1319 feet), fartun SpO2 94% on room air 1/2025: 384 meters (1260 feet), fartun SpO2 96% on room air  A/P: 57 yo F h/o stage IIIB NSCLC (RUL mass, tracheal involvement N2 and N3 lymph nodes) s/p concurrent chemoradiation and Osimertinib), recent PE, and cancer treatment related pneumonitis (Osimertinib) returns to clinic.  We are weaning Ms. Darby's Prednisone q2 weeks for her pneumonitis. Her respiratory symptoms have resolved. She is having minimal GERD symptoms. She is not interested in additional medications for control (i.e., PPI).   We will repeat a CXR today.  She is taking Bactrim SS, calcium, and Vitamin D daily. We will check a DEXA at her next visit.  1. Cancer treatment-related pneumonitis: radiation vs chemotherapy vs Osimertinib 2. NSCLC (stage IIIB) with EGFR mutation 3. Fatigue 4. h/o PE (6/2024) 5. Moderate COPD 6. R pleural effusion, resolved  -Prednisone 35 mg PO daily, weaning by 5 mg q2 weeks; she will call me if her mother develops side effects needing a faster taper -Prophylaxis with Bactrim SS daily -Calcium and Vitamin D supplementation -discontinued Osimertinib; discussed with Dr. Ahuja -Famotidine 20 mg PO daily for GERD -OK to hold Symbicort if not providing significant relief -continue Apixaban 5 mg PO BID -CXR today; chest CT in 3 months (4/2025). -Immunizations: not discussed today -follow-up with me in 4 weeks

## 2025-04-09 NOTE — HISTORY OF PRESENT ILLNESS
[Never] : never [TextBox_4] : Ms. America Darby returned to clinic today in follow-up for lung cancer with treatment-related pneumonitis; she was accompanied by her daughter, who supplements her history. In review, Ms. Darby is a 59 yo F h/o NSCLC (adenocarcinoma, stage IIIB (RUL, tracheal involvement, mediastinal and supraclavicular lymph nodes; EGFR mutation)) s/p concurrent chemoradiation with adjuvant Osimertinib who was hospitalized in 9/2024 for cough, progressive exertional dyspnea, and acute hypoxemic respiratory failure. She underwent bronchoscopy that ruled out alveolar hemorrhage; organizing pneumonia on biopsy and the clinical presentation favored drug related pneumonitis. With 2 mg/kg Methylprednisolone, she improved. A small R pleural effusion was noted during hospitalization, though not amenable to thoracentesis. With re-initiation of Osimertinib, her lung opacities and cough recurred.  Cancer treatment course 5/23/2024: C1 cisplatin/pemetrexed RT start 6/3/2024 6/13/24 C2 cisplatin/pemetrexed 7/10/2024: C3 cisplatin/pemetrexed completed 7/18/2024: Last day of RT 8/24/2024: Osimertinib 80 mg Qd-. 12/2024: Osimertinib re-trial: 40 mg PO daily 1/2025: Symptom and radiographic recurrence; Osimertinib discontinuation  10/2024: Since discharge, Ms. Darby has felt steadily better. She is no longer using supplemental oxygen. She is able to walk at a normal pace for 30 minutes before having to rest. Her appetite and weight are stable. She is not having cough. With Prednisone, she is having some epigastric burning.  10/2024: Ms. Darby has continued to feel better on her steroid taper, currently at Prednisone 25 mg PO daily. She has remained active via walking 30 minutes/day. She has some dyspnea when walking up hills. Her appetite and weight are stable. She has noticed some blurriness in her vision, which she associates with cataracts. She has an Ophthalmology visit next week.  12/18/24 She is doing well she restarted the Osimertinib about a month ago and stopped the prednisone about a week ago. She feels well about the same without any worsening of her symptoms. She walks everyday for about 30 minutes but gets short of breath after about 3 flights of stairs. Denies any recent fevers, chills, infections. Still has a dry cough that comes and goes without any predilection for time of day.   1/2025: I spoke to Ms. Darby's daughter today. Since our last visit, she has had worsened cough and exertional dyspnea. She is not having fever, chills, or sputum production. She is taking the Symbicort inconsistently. She was evaluated by her cardiologist and had an echocardiogram recently, which were reportedly normal.  1/2025: Ms. Darby reports feeling "ok" today. Her daughter notes continued daily cough that is persistent. She has dyspnea when climbing stairs and walking long distances. Her appetite and weight are stable. No fever or chills.  1/28/25: I saw Ms. Darby in follow-up today. She was accompanied by her daughter, who supplements her history. Ms. Darby remains on Prednisone and has felt steadily better over the last week. She has persistent dry cough, though her exertional dyspnea and fatigue are improving.   2/11/25: Ms. Darby feels nearly back to baseline. Her cough has resolved, and her exercise tolerance is increased. She is currently taking Prednisone 50 mg PO daily, Bactrim SS daily, and supplemental Calcium/Vitamin D.  3/11/25: Ms. Darby continues to feel well. She is weaning her Prednisone regularly. Her cough and dyspnea have resolved. She is walking regularly and climbing stairs without difficulty. She endorses occasional epigastric burning, which responds to taking Tums. Her weight and appetite are stable.  4/8/2025: Ms. Darby is feeling well today. Her Prednisone is down to 20 mg PO daily. She is continuing to taper on a q2 week basis. Her face has had swelling; the cough, dyspnea, and fatigue have resolved. Her weight and appetite are stable.  PMH: NSCLC (adenocarcinoma, stage IIIB (RUL, tracheal involvement, mediastinal and supraclavicular lymph nodes; EGFR mutation) PE (LLL; 7/2024) Afib s/p ablation  SH: Never smoker. History of second-hand smoke exposure.

## 2025-04-09 NOTE — ASSESSMENT
[FreeTextEntry1] : Labs: 3/2025: WBC 9.41, Hgb 12.4, Plts 280 Na 141, K 4.8, Cl 101, HCO3 28, BUN/creat 21/0.91, glucose 130, Ca 8.8 Tbili 0.2, AST/ALT 22/24, Alk phos 75, TP/Albumin 6.9/4.5 B12 730,  Folate 13.9   RVP (1/21/25): negative  Bronchoscopy (4/2024): BAL: POSITIVE FOR MALIGNANT CELLS. Non- small cell carcinoma.  Final Diagnosis Trachea, biopsy and touch preparation: -Adenocarcinoma of lung origin. Note: Immunostains performed shows that the tumor cells are positive for TTF-1 and negative for p40. This  staining profile supports the diagnosis.  Final Diagnosis LYMPH NODE, 4R, EBUS-GUIDED FNA POSITIVE FOR MALIGNANT CELLS. Metastatic non-small cell carcinoma. Cell block shows similar findings. Positive EGFR mutation   Bronchoscopy (9/2024): WBC 38 (L15, P12, M8, Eos 3)  BRONCHOALVEOLAR LAVAGE, RIGHT, LOWER LOBE Final Diagnosis LUNG, RIGHT LOWER LOBE, BAL: NEGATIVE FOR MALIGNANT CELLS Benign bronchial cells, alveolar macrophages, inflammatory cells. The cellblock shows similar findings.  Transbronchial biopsy: Final Diagnosis 1. Lung , right lower lobe, biopsy: - A minute fragment of lung tissue with reactive changes and focal areas suggestive of an organizing pneumonia.  Bacterial culture: negative AFB culture: NGTD Fungus culture: NGTD  BAL PJP PCR negative.  PET/CT (4/2024): Right upper lobe lung mass shows significant metabolic activity and likely malignant. Bilateral supraclavicular, mediastinal and bilateral hilar adenopathy showing moderate-significant activity. Nodularity and thickening along the right major fissure in the upper lobe but no metabolic activity. Left adnexal cyst. In a postmenopausal patient, this can be followed by transvaginal ultrasound urgent basis. Bilateral cystic structures following there is sacral foramen which are probably perineural cysts and can be further evaluated with MRI of the sacrum.  Chest CT (6/26/24) 1. Nonocclusive segmental branch pulmonary artery embolism involving the lateral basal segment to the left lower lobe. No evidence of pulmonary hemorrhage or infarction. No pulmonary artery dilatation or right ventricular strain. Loculated right upper lobe neoplasm which may be minimally smaller in size. Stable confluent mediastinal lymphadenopathy with near complete occlusion of the brachiocephalic confluence and proximal IVC  Ct Chest (8/2024): 1. Slight interval decrease in the size of a spiculated nodular neoplasm within the apical segment of the right upper lobe. 2. 2 new linear opacities within the apical segment of the right upper lobe as above. Short interval surveillance is suggested. 3. The remainder the study is unchanged with bulky mediastinal and perihilar lymphadenopathy with stable near complete slitlike occlusion of the superior vena cava and mild progressive narrowing of the right main and right upper lobe bronchus.  CT PE (9/2024): IMPRESSION: 1. No pulmonary embolism. 2. Since August 20, 2024, new bilateral symmetric consolidative and groundglass airspace disease with mid and upper lung field predominance. Differential for this pattern includes noncardiogenic pulmonary edema (including ARDS), hypersensitivity pneumonitis, cardiogenic pulmonary edema/CHF, pulmonary alveolar proteinosis, diffuse alveolar hemorrhage, or atypical pneumonia (including Pneumocystis jirovecii). 3. Mildly increased right pleural effusion.  Chest CT (11/2024): IMPRESSION: 1. Interval resolution of perihilar crazy paving opacities with newly developing perihilar consolidation which encases the bronchovascular bundles with newly developing traction bronchiectasis. This may represent the sequela of radiation fibrosis. Possibility of lymphangitic carcinomatosis cannot be excluded. 2. Mild interval improvement in diffuse infiltration of the mediastinal fat planes which may represent a mildly improving lymphadenopathy. 3. Limited evaluation of the vessels and inferior vena cava the absence of intravenous contrast.  CXR (12/2024) IMPRESSION: Lung infiltrates considerably improved/nearly resolved on left side and partially improved on right side compared to prior exam 9/17/2024. No acute infiltrates appearing. No significant pleural effusion. No pneumothorax. Unremarkable cardiac silhouette. No acute bone abnormality.  Chest CT (1/2025): IMPRESSION:  1. Since November 1, 2024, new left lower lobe opacity and right greater than left small pleural effusions on background of unchanged perihilar fibrosis, probably inflammatory etiology such as organizing pneumonia.  2. No recurrence.  CXR (2/2025): IMPRESSION: Bilateral perihilar/upper lung infiltrates partially improved compared to prior exam 12/18/2024. The pleural effusions have improved. No acute infiltrate appearing. No pneumothorax. Unremarkable cardiac silhouette.  CXR (3/2025): IMPRESSION: Similar appearance to prior exam 2/11/2025. No focal or acute infiltrate. No significant pleural effusion. No pneumothorax.  Chest CT (4/2025): FINDINGS: IMPRESSION: 1. Since 1/14/2025, there has been continued evolution of the radiation induced pulmonary fibrosis in the perihilar regions bilaterally. 2. Resolution of bilateral pleural effusions. 3. Improvement in mediastinal lymphadenopathy.  PFTs             12/18/24 1/2025 FEV1/FVC     70%               72% FEV1             1.39, 62%      1.28, 57% FVC               2.00, 69%      1.78, 62% TLC               -                      2.19, 51% RV                 -                      0.41, 24% DLCO            7.78. 38%       7.45, 36% DLadj            42% -12/2024: Positive bronchodilator response (FEV1 improved 160mL, 13%; FEV1/FVC normalizes)  6MWT: 12/2024: 402 meters (1319 feet), fartun SpO2 94% on room air 1/2025: 384 meters (1260 feet), fartun SpO2 96% on room air  A/P: 57 yo F h/o stage IIIB NSCLC (RUL mass, tracheal involvement N2 and N3 lymph nodes) s/p concurrent chemoradiation and Osimertinib), recent PE, and cancer treatment related pneumonitis (Osimertinib) returns to clinic.  Ms. Darby feels well today. She is tolerating the Prednisone taper well. When the Prednisone is <20 mg/day, she will stop the Bactrim. Given the prolonged course of Prednisone, I would like to obtain a DEXA.  Her chest CT is improved today compared to prior with resolution of the ground glass and pleural effusion. She saw Dr. Llyod this week; next CT is planned for 6 months.   1. Cancer treatment-related pneumonitis: radiation vs chemotherapy vs Osimertinib 2. NSCLC (stage IIIB) with EGFR mutation 3. Fatigue 4. h/o PE (6/2024) 5. Moderate COPD 6. R pleural effusion, resolved  -Prednisone 20 mg PO daily; tapering by 5 mg/day q2 weeks -Calcium and Vitamin D supplementation -discontinued Osimertinib; discussed with Drr. Vojnic and Gabino -stop Bactrim when <20 mg/day -DEXA -continue Apixaban 5 mg PO BID -Immunizations: not discussed today -follow-up with Dr. Aguirre in 3 months.

## 2025-04-09 NOTE — PHYSICAL EXAM
[No Acute Distress] : no acute distress [Normal Appearance] : normal appearance [Normal Rate/Rhythm] : normal rate/rhythm [Normal S1, S2] : normal s1, s2 [No Murmurs] : no murmurs [No Resp Distress] : no resp distress [Clear to Auscultation Bilaterally] : clear to auscultation bilaterally [No Abnormalities] : no abnormalities [Benign] : benign [Normal Gait] : normal gait [No Clubbing] : no clubbing [No Cyanosis] : no cyanosis [No Edema] : no edema [FROM] : FROM [Normal Color/ Pigmentation] : normal color/ pigmentation [No Focal Deficits] : no focal deficits [Oriented x3] : oriented x3 [Normal Affect] : normal affect [TextBox_2] : Moon facies [TextBox_11] : NC/AT

## 2025-07-15 NOTE — PROCEDURE
[FreeTextEntry1] : Bronchoscopy (4/2024): BAL: POSITIVE FOR MALIGNANT CELLS. Non- small cell carcinoma.  Final Diagnosis Trachea, biopsy and touch preparation: -Adenocarcinoma of lung origin. Note: Immunostains performed shows that the tumor cells are positive for TTF-1 and negative for p40. This staining profile supports the diagnosis.  Final Diagnosis LYMPH NODE, 4R, EBUS-GUIDED FNA POSITIVE FOR MALIGNANT CELLS. Metastatic non-small cell carcinoma. Cell block shows similar findings. Positive EGFR mutation   Bronchoscopy (9/2024): WBC 38 (L15, P12, M8, Eos 3)  BRONCHOALVEOLAR LAVAGE, RIGHT, LOWER LOBE Final Diagnosis LUNG, RIGHT LOWER LOBE, BAL: NEGATIVE FOR MALIGNANT CELLS Benign bronchial cells, alveolar macrophages, inflammatory cells. The cellblock shows similar findings.  Transbronchial biopsy: Final Diagnosis 1. Lung , right lower lobe, biopsy: - A minute fragment of lung tissue with reactive changes and focal areas suggestive of an organizing pneumonia.  Bacterial culture: negative AFB culture: NGTD Fungus culture: NGTD  BAL PJP PCR negative.  PET/CT (4/2024): Right upper lobe lung mass shows significant metabolic activity and likely malignant. Bilateral supraclavicular, mediastinal and bilateral hilar adenopathy showing moderate-significant activity. Nodularity and thickening along the right major fissure in the upper lobe but no metabolic activity. Left adnexal cyst. In a postmenopausal patient, this can be followed by transvaginal ultrasound urgent basis. Bilateral cystic structures following there is sacral foramen which are probably perineural cysts and can be further evaluated with MRI of the sacrum.  Chest CT (6/26/24) 1. Nonocclusive segmental branch pulmonary artery embolism involving the lateral basal segment to the left lower lobe. No evidence of pulmonary hemorrhage or infarction. No pulmonary artery dilatation or right ventricular strain. Loculated right upper lobe neoplasm which may be minimally smaller in size. Stable confluent mediastinal lymphadenopathy with near complete occlusion of the brachiocephalic confluence and proximal IVC  Ct Chest (8/2024): 1. Slight interval decrease in the size of a spiculated nodular neoplasm within the apical segment of the right upper lobe. 2. 2 new linear opacities within the apical segment of the right upper lobe as above. Short interval surveillance is suggested. 3. The remainder the study is unchanged with bulky mediastinal and perihilar lymphadenopathy with stable near complete slitlike occlusion of the superior vena cava and mild progressive narrowing of the right main and right upper lobe bronchus.  Chest CT (4/2025): FINDINGS: IMPRESSION: 1. Since 1/14/2025, there has been continued evolution of the radiation induced pulmonary fibrosis in the perihilar regions bilaterally. 2. Resolution of bilateral pleural effusions. 3. Improvement in mediastinal lymphadenopathy.  PFTs 12/18/24 1/2025 FEV1/FVC 70% 72% FEV1 1.39, 62% 1.28, 57% FVC 2.00, 69% 1.78, 62% TLC - 2.19, 51% RV - 0.41, 24% DLCO 7.78. 38% 7.45, 36% DLadj 42% -12/2024: Positive bronchodilator response (FEV1 improved 160mL, 13%; FEV1/FVC normalizes)  6MWT: 12/2024: 402 meters (1319 feet), fartun SpO2 94% on room air 1/2025: 384 meters (1260 feet), fartun SpO2 96% on room air

## 2025-07-15 NOTE — HISTORY OF PRESENT ILLNESS
[TextBox_4] : 58 y/o Female former pt of Dr. Nieto, Ms. Darby is a 59 yo F h/o NSCLC (adenocarcinoma, stage IIIB (RUL, tracheal involvement, mediastinal and supraclavicular lymph nodes; EGFR mutation)) s/p concurrent chemoradiation with adjuvant Osimertinib who was hospitalized in 9/2024 for cough, progressive exertional dyspnea, and acute hypoxemic respiratory failure. She underwent bronchoscopy that ruled out alveolar hemorrhage; organizing pneumonia on biopsy and the clinical presentation favored drug related pneumonitis. With 2 mg/kg Methylprednisolone, she improved. A small R pleural effusion was noted during hospitalization, though not amenable to thoracentesis. With re-initiation of Osimertinib, her lung opacities and cough recurred.  Cancer treatment course 5/23/2024: C1 cisplatin/pemetrexed RT start 6/3/2024 6/13/24 C2 cisplatin/pemetrexed 7/10/2024: C3 cisplatin/pemetrexed completed 7/18/2024: Last day of RT 8/24/2024: Osimertinib 80 mg Qd-. 12/2024: Osimertinib re-trial: 40 mg PO daily 1/2025: Symptom and radiographic recurrence; Osimertinib discontinuation

## 2025-07-15 NOTE — ASSESSMENT
[FreeTextEntry1] : 7-22-25:  It was a pleasure to meet America today in consultation. Her respiratory issues are summarized:  1.

## 2025-07-22 NOTE — ASSESSMENT
[FreeTextEntry1] : 7-22-25:  It was a pleasure to meet [place] today in consultation. Her respiratory issues are summarized:  1. Lung Cancer Lifelong nonsmoker Adenocarcinoma, stage IIIB NSCLC with EGFR L858R mutation affecting the right upper lobe. There were 4R lymph nodes positive on EBUS guided FNA. She was given radiation therapy followed by Osimertinib; however, on Osimertinib 80mg she developed pneumonitis. Osimertinib was stopped and a lower dose was given (40mg); however, she still developed radiation pneumonitis. At that point in time, the decision was made to stop chemotherapy. She had bulky supraclavicular, mediastinal, and bilateral hilar lymph nodes classifying her tumor as Stage IIIB. When we review the CT of the chest from April 2025, there is a significant shrinking of the right upper lobe mass. There is very pronounced perihilar radiation induced pneumonitis, both on the right and the left side. We looked specifically for loss of air bronchograms, new ground glass opacities, and new soft tissue densities with a convex margin to indicate if there was any recurrence. None of these features are present on the CT of the chest. In addition, there were two other findings from 4/4/25. There is a significant right sided pleural effusion which has disappeared and SVC compression from right upper lobe mass that is significantly less. We cannot see if there is a blood clot since the CT was without contrast. However, on the CT from 6/2024, a non-occlusive segmental pulmonary embolus was present in the lateral basal segment of left lower lobe. We had a conversation with Dr. Lloyd. We agreed that a CT of the chest should be repeated in 6 months from the prior CT (October 2025). Dr. Lloyd will see her soon after the CT scan. She will discuss with Dr. Lloyd any other chemotherapy to be started.  We will also order an echocardiogram to evaluate for radiation changes to the heart.  2. Restrictive lung disease FVC is 62% of predicted and TLC is 51% of predicted. This is consistent with concentric restriction from radiation induced pneumonitis. In addition, she had a reduced diffusion capacity of 36%. What is important is that in Dec 2024, she had an improvement of FEV1 by 100mL and 13% improvement in FEV1/FVC with bronchodilator. It is likely that there may be progression resulting in greater reduction from radiation induced pneumonitis.  In December and January, she was able to walk approximately 400 meters without desaturation. We will repeat PFT, 6MWT at this time to assess her functional status  3. PE On Eliquis 5mg BID, agree with anticoagulation for now HAS-BLED score is low: 0  4. Cough Cough is significantly less. She has been tapered off prednisone in May 2025. Bactrim has been stopped. She continues to be on Symbicort. We have suggested that she continue Symbicort for now. This may help with the cough. There was also a reversible component seen on the PFTs that would be addressed  Return to clinic: 6 months

## 2025-07-22 NOTE — HISTORY OF PRESENT ILLNESS
[Never] : never [TextBox_4] : 58 y/o Female former pt of Dr. Nieto, Ms. Darby is a 58 yo F h/o NSCLC (adenocarcinoma, stage IIIB (RUL, tracheal involvement, mediastinal and supraclavicular lymph nodes; EGFR mutation)) s/p concurrent chemoradiation with adjuvant Osimertinib who was hospitalized in 9/2024 for cough, progressive exertional dyspnea, and acute hypoxemic respiratory failure. She underwent bronchoscopy that ruled out alveolar hemorrhage; organizing pneumonia on biopsy and the clinical presentation favored drug related pneumonitis. With 2 mg/kg Methylprednisolone, she improved. A small R pleural effusion was noted during hospitalization, though not amenable to thoracentesis. With re-initiation of Osimertinib, her lung opacities and cough recurred.  Cancer treatment course 5/23/2024: C1 cisplatin/pemetrexed RT start 6/3/2024 6/13/24 C2 cisplatin/pemetrexed 7/10/2024: C3 cisplatin/pemetrexed completed 7/18/2024: Last day of RT 8/24/2024: Osimertinib 80 mg Qd-. 12/2024: Osimertinib re-trial: 40 mg PO daily 1/2025: Symptom and radiographic recurrence; Osimertinib (Tagrisso) discontinuation because of pneumonitis She has not had any treatment in  She has been on a prednisone taper to recover from the pneumonitis, which finished in May. She went to vacation in [place], which was a lot of walking. Coughing was bad. She was coughing once every 10 minutes. She coughs infrequently. She sometimes gets random bouts of chest pain, happened during trip in July. She states it is different than heartburn. It is not reproducible to palpation. It lasts approximately 10 minutes She had a PE diagnosed during chemo, on Eliquis 5mg BID. Also, during chemo had afib for which she had an ablation Oncologist: Dr. Lloyd She can walk an hour before sitting. She can walk up 1 flight of stairs.  She is not at her baseline, but she is much improved O2 saturation has always been above 90%

## 2025-07-22 NOTE — DISCUSSION/SUMMARY
[FreeTextEntry1] : ATTENDING'S SUMMARY: 7-22-25: mild pallor, no icterus no JVD, no hepatojugular reflux, no HSM no cervical adenopathy, no supraclavicular adenopathy normal s1/s2, no murmurs, rubs or gallops mildly diminished breath sounds on both sides no cyanosis, no clubbing, no articular manifestations, no thickening of the skin no pedal edema

## 2025-07-22 NOTE — ADDENDUM
[FreeTextEntry1] :    I, Dr. Chance Aguirre, personally performed the evaluation and management services for this patient who presents  today as a new consultative visit. I personally performed the services described in the documentation, reviewed the documentation recorded by the scribe in my presence and it accurately and completely records my words and actions     Ms. Antonette Schmid  acted as a scribe in writing the note that was dictated by me.           I spent  a total of   45 minutes evaluating the patient today. This includes spending 10 min on reviewing the patient's serial performed investigations, discussing today's PFTs, the causes of symptoms, the need to get further investigations, shared decision making regarding follow up.     The remaining time was spent with the patient in discussing health maintenance, benefits of an active life style and avoidance of inhalational exposure. We discussed the causes of dyspnea on exertion and how we would be closely monitoring the symptoms.  We discussed the sequence of investigations and the possible treatment strategy.  His questions were satisfactorily answered.        This time does not include performance of any procedures such as PFTs or any teaching

## 2025-07-22 NOTE — HISTORY OF PRESENT ILLNESS
[Never] : never [TextBox_4] : 58 y/o Female former pt of Dr. Nieto, Ms. Darby is a 60 yo F h/o NSCLC (adenocarcinoma, stage IIIB (RUL, tracheal involvement, mediastinal and supraclavicular lymph nodes; EGFR mutation)) s/p concurrent chemoradiation with adjuvant Osimertinib who was hospitalized in 9/2024 for cough, progressive exertional dyspnea, and acute hypoxemic respiratory failure. She underwent bronchoscopy that ruled out alveolar hemorrhage; organizing pneumonia on biopsy and the clinical presentation favored drug related pneumonitis. With 2 mg/kg Methylprednisolone, she improved. A small R pleural effusion was noted during hospitalization, though not amenable to thoracentesis. With re-initiation of Osimertinib, her lung opacities and cough recurred.  Cancer treatment course 5/23/2024: C1 cisplatin/pemetrexed RT start 6/3/2024 6/13/24 C2 cisplatin/pemetrexed 7/10/2024: C3 cisplatin/pemetrexed completed 7/18/2024: Last day of RT 8/24/2024: Osimertinib 80 mg Qd-. 12/2024: Osimertinib re-trial: 40 mg PO daily 1/2025: Symptom and radiographic recurrence; Osimertinib (Tagrisso) discontinuation because of pneumonitis She has not had any treatment in  She has been on a prednisone taper to recover from the pneumonitis, which finished in May. She went to vacation in [place], which was a lot of walking. Coughing was bad. She was coughing once every 10 minutes. She coughs infrequently. She sometimes gets random bouts of chest pain, happened during trip in July. She states it is different than heartburn. It is not reproducible to palpation. It lasts approximately 10 minutes She had a PE diagnosed during chemo, on Eliquis 5mg BID. Also, during chemo had afib for which she had an ablation Oncologist: Dr. Lloyd She can walk an hour before sitting. She can walk up 1 flight of stairs.  She is not at her baseline, but she is much improved O2 saturation has always been above 90%

## 2025-07-22 NOTE — HISTORY OF PRESENT ILLNESS
[Never] : never [TextBox_4] : 58 y/o Female former pt of Dr. Nieto, Ms. Darby is a 60 yo F h/o NSCLC (adenocarcinoma, stage IIIB (RUL, tracheal involvement, mediastinal and supraclavicular lymph nodes; EGFR mutation)) s/p concurrent chemoradiation with adjuvant Osimertinib who was hospitalized in 9/2024 for cough, progressive exertional dyspnea, and acute hypoxemic respiratory failure. She underwent bronchoscopy that ruled out alveolar hemorrhage; organizing pneumonia on biopsy and the clinical presentation favored drug related pneumonitis. With 2 mg/kg Methylprednisolone, she improved. A small R pleural effusion was noted during hospitalization, though not amenable to thoracentesis. With re-initiation of Osimertinib, her lung opacities and cough recurred.  Cancer treatment course 5/23/2024: C1 cisplatin/pemetrexed RT start 6/3/2024 6/13/24 C2 cisplatin/pemetrexed 7/10/2024: C3 cisplatin/pemetrexed completed 7/18/2024: Last day of RT 8/24/2024: Osimertinib 80 mg Qd-. 12/2024: Osimertinib re-trial: 40 mg PO daily 1/2025: Symptom and radiographic recurrence; Osimertinib (Tagrisso) discontinuation because of pneumonitis She has not had any treatment in  She has been on a prednisone taper to recover from the pneumonitis, which finished in May. She went to vacation in [place], which was a lot of walking. Coughing was bad. She was coughing once every 10 minutes. She coughs infrequently. She sometimes gets random bouts of chest pain, happened during trip in July. She states it is different than heartburn. It is not reproducible to palpation. It lasts approximately 10 minutes She had a PE diagnosed during chemo, on Eliquis 5mg BID. Also, during chemo had afib for which she had an ablation Oncologist: Dr. Lloyd She can walk an hour before sitting. She can walk up 1 flight of stairs.  She is not at her baseline, but she is much improved O2 saturation has always been above 90%